# Patient Record
Sex: MALE | Race: WHITE | NOT HISPANIC OR LATINO | Employment: FULL TIME | URBAN - METROPOLITAN AREA
[De-identification: names, ages, dates, MRNs, and addresses within clinical notes are randomized per-mention and may not be internally consistent; named-entity substitution may affect disease eponyms.]

---

## 2017-09-25 ENCOUNTER — OFFICE VISIT (OUTPATIENT)
Dept: MEDICAL GROUP | Facility: PHYSICIAN GROUP | Age: 42
End: 2017-09-25
Payer: COMMERCIAL

## 2017-09-25 VITALS
WEIGHT: 160 LBS | DIASTOLIC BLOOD PRESSURE: 70 MMHG | HEART RATE: 70 BPM | OXYGEN SATURATION: 95 % | SYSTOLIC BLOOD PRESSURE: 120 MMHG | HEIGHT: 68 IN | BODY MASS INDEX: 24.25 KG/M2 | TEMPERATURE: 99.8 F

## 2017-09-25 DIAGNOSIS — R68.82 LOW LIBIDO: ICD-10-CM

## 2017-09-25 DIAGNOSIS — E78.2 MIXED HYPERLIPIDEMIA: ICD-10-CM

## 2017-09-25 DIAGNOSIS — R74.8 ELEVATED LIVER ENZYMES: ICD-10-CM

## 2017-09-25 DIAGNOSIS — F12.90 MARIJUANA USE, CONTINUOUS: ICD-10-CM

## 2017-09-25 DIAGNOSIS — Z00.00 ANNUAL PHYSICAL EXAM: ICD-10-CM

## 2017-09-25 DIAGNOSIS — N52.9 ERECTILE DYSFUNCTION, UNSPECIFIED ERECTILE DYSFUNCTION TYPE: ICD-10-CM

## 2017-09-25 PROCEDURE — 99396 PREV VISIT EST AGE 40-64: CPT | Mod: 25 | Performed by: PHYSICIAN ASSISTANT

## 2017-09-25 PROCEDURE — 99213 OFFICE O/P EST LOW 20 MIN: CPT | Performed by: PHYSICIAN ASSISTANT

## 2017-09-25 RX ORDER — SILDENAFIL 100 MG/1
100 TABLET, FILM COATED ORAL PRN
Qty: 3 TAB | Refills: 0 | Status: SHIPPED | OUTPATIENT
Start: 2017-09-25 | End: 2017-10-17 | Stop reason: SDUPTHER

## 2017-09-25 ASSESSMENT — PATIENT HEALTH QUESTIONNAIRE - PHQ9: CLINICAL INTERPRETATION OF PHQ2 SCORE: 0

## 2017-09-25 NOTE — ASSESSMENT & PLAN NOTE
New problem. Patient states that over the last year, he has noticed decrease in his sex drive, which is very concerning to him. Has had problems with ED for many years, but sex drive was always fine up until a year ago. Also feels very sluggish throughout the day, even when he gets a good nights sleep. Looked his symptoms up online and is worried about low testosterone.

## 2017-09-25 NOTE — ASSESSMENT & PLAN NOTE
Endorses problems with this since his 20s. Does feel that there is a strong mental component, as when he worries that he may not be able to perform, he is unable to maintain an erection. Was started on Viagra by previous PCP, which has usually helped, although not as much recently. Caveats, however, that the pills he's been using are nearly 2 years past the expiration date. Is requesting refill today.

## 2017-09-25 NOTE — PROGRESS NOTES
Chief Complaint: Annual, erectile dysfunction, low sex drive    Barrett Goldstein is a 42 y.o. male who presents for the above complaints. He is a new patient to me and is also establishing care.    Last colonoscopy: N/A  Last Td: 2008, per state registry  Regular exercise: yes. Goes to the gym consistently.    He  has a past medical history of Erectile dysfunction and Hyperlipidemia. He also has no past medical history of Allergy.  He  has a past surgical history that includes vasectomy.  Current Outpatient Prescriptions   Medication Sig Dispense Refill   • sildenafil citrate (VIAGRA) 100 MG tablet Take 1 Tab by mouth as needed for Erectile Dysfunction. 3 Tab 0     No current facility-administered medications for this visit.      Social History   Substance Use Topics   • Smoking status: Never Smoker   • Smokeless tobacco: Never Used   • Alcohol use 7.2 oz/week     12 Cans of beer per week       Current Problems:  Hyperlipidemia  Patient endorses history of this in the past. Has not had labs done since 2013. Patient states he has improved his lifestyle since then. Specifically, states that he is eating better and going to the gym consistently.    Erectile dysfunction  Endorses problems with this since his 20s. Does feel that there is a strong mental component, as when he worries that he may not be able to perform, he is unable to maintain an erection. Was started on Viagra by previous PCP, which has usually helped, although not as much recently. Caveats, however, that the pills he's been using are nearly 2 years past the expiration date. Is requesting refill today.    Low libido  New problem. Patient states that over the last year, he has noticed decrease in his sex drive, which is very concerning to him. Has had problems with ED for many years, but sex drive was always fine up until a year ago. Also feels very sluggish throughout the day, even when he gets a good nights sleep. Looked his symptoms up online and is worried  "about low testosterone.     Elevated liver enzymes  History of this in the past when he was seeing prior PCP. Patient does mention that he used to drink quite heavily and has since significantly cut back.     Marijuana use, continuous  Patient admits to daily marijuana use for many years (at least since his 20s).      Review Of Systems  Eyes: negative for visual blurring, double vision, eye pain  Ears/Nose/Throat: negative for ear pain, hearing loss  Respiratory: negative for dyspnea  Cardiovascular: negative for chest pain or pressure  Gastrointestinal: negative for nausea, abdominal pain, constipation or diarrhea, black stool or bloody stool  Genitourinary: positive for erectile dysfunction, low libido. Negative for testicular atrophy  Psychiatric: positive for sexual difficulties      PHYSICAL EXAMINATION:  Blood pressure 120/70, pulse 70, temperature 37.7 °C (99.8 °F), height 1.727 m (5' 8\"), weight 72.6 kg (160 lb), SpO2 95 %.  Body mass index is 24.33 kg/m².  Wt Readings from Last 4 Encounters:   09/25/17 72.6 kg (160 lb)   01/21/16 74.7 kg (164 lb 9.6 oz)   09/12/14 71.7 kg (158 lb)   08/04/14 72.6 kg (160 lb)       Constitutional: Alert, well-built, no distress.  Skin: Warm, dry, good turgor, multiple tattoos on upper body; no rashes or suspicious lesions in visible areas.  Eye: pupils equal, round and reactive to light, conjunctivae clear, lids normal.  ENMT: Lips without lesions, good dentition, oropharynx clear. Pinnae skin normal with no lesions. TM pearly gray with normal light reflex.   Neck: supple, no masses. No submandibular, anterior cervical adenopathy. No thyromegaly.  Respiratory: Unlabored respiratory effort, lungs clear to auscultation, no wheezes, no ronchi.  Cardiovascular: Normal S1, S2, no murmur, no peripheral edema.  Abdomen: Abdomen Soft, non-tender, no masses, no hepatosplenomegaly. No CVAT.  Psych: Alert and oriented x3, normal affect and mood.  Musc/Skel: Normal motion UE and LE " proximal and distal.        ASSESSMENT/PLAN:    1. Annual physical exam  HCM:  Influenza vaccine offered today, but patient declined. All other preventative screening is UTD.   Labs ordered  Pt counseled about diet and exercise.  - COMP METABOLIC PANEL; Future  - CBC WITH DIFFERENTIAL; Future  - LIPID PROFILE; Future  - TSH WITH REFLEX TO FT4; Future    2. Erectile dysfunction, unspecified erectile dysfunction type  Explained to patient that we can certainly check his testosterone, but suspect his chronic heavy marijuana use is largely contributing to his sexual dysfunction. Patient was advised to stop smoking marijuana. I did review his serum testosterone level from 2013, which was normal.  - sildenafil citrate (VIAGRA) 100 MG tablet; Take 1 Tab by mouth as needed for Erectile Dysfunction.  Dispense: 3 Tab; Refill: 0  - TESTOSTERONE SERUM; Future    3. Low libido  - TESTOSTERONE SERUM; Future    4. Mixed hyperlipidemia  Will re-check lipid panel.  - LIPID PROFILE; Future    5. Elevated liver enzymes  Will re-check CMP.    6. Marijuana use, continuous  Cessation was encouraged, both for overall health and to improve libido and sexual function.    Return if symptoms worsen or fail to improve.    Ching Frey P.A.-C.

## 2017-09-25 NOTE — ASSESSMENT & PLAN NOTE
Patient endorses history of this in the past. Has not had labs done since 2013. Patient states he has improved his lifestyle since then. Specifically, states that he is eating better and going to the gym consistently.

## 2017-09-25 NOTE — ASSESSMENT & PLAN NOTE
History of this in the past when he was seeing prior PCP. Patient does mention that he used to drink quite heavily and has since significantly cut back.

## 2017-09-26 ENCOUNTER — HOSPITAL ENCOUNTER (OUTPATIENT)
Dept: LAB | Facility: MEDICAL CENTER | Age: 42
End: 2017-09-26
Attending: PHYSICIAN ASSISTANT
Payer: COMMERCIAL

## 2017-09-26 DIAGNOSIS — E78.2 MIXED HYPERLIPIDEMIA: ICD-10-CM

## 2017-09-26 DIAGNOSIS — N52.9 ERECTILE DYSFUNCTION, UNSPECIFIED ERECTILE DYSFUNCTION TYPE: ICD-10-CM

## 2017-09-26 DIAGNOSIS — R68.82 LOW LIBIDO: ICD-10-CM

## 2017-09-26 DIAGNOSIS — Z00.00 ANNUAL PHYSICAL EXAM: ICD-10-CM

## 2017-09-26 LAB
ALBUMIN SERPL BCP-MCNC: 4.6 G/DL (ref 3.2–4.9)
ALBUMIN/GLOB SERPL: 1.8 G/DL
ALP SERPL-CCNC: 55 U/L (ref 30–99)
ALT SERPL-CCNC: 15 U/L (ref 2–50)
ANION GAP SERPL CALC-SCNC: 7 MMOL/L (ref 0–11.9)
AST SERPL-CCNC: 23 U/L (ref 12–45)
BASOPHILS # BLD AUTO: 0.5 % (ref 0–1.8)
BASOPHILS # BLD: 0.03 K/UL (ref 0–0.12)
BILIRUB SERPL-MCNC: 0.8 MG/DL (ref 0.1–1.5)
BUN SERPL-MCNC: 20 MG/DL (ref 8–22)
CALCIUM SERPL-MCNC: 9.9 MG/DL (ref 8.5–10.5)
CHLORIDE SERPL-SCNC: 106 MMOL/L (ref 96–112)
CHOLEST SERPL-MCNC: 140 MG/DL (ref 100–199)
CO2 SERPL-SCNC: 27 MMOL/L (ref 20–33)
CREAT SERPL-MCNC: 0.96 MG/DL (ref 0.5–1.4)
EOSINOPHIL # BLD AUTO: 0.15 K/UL (ref 0–0.51)
EOSINOPHIL NFR BLD: 2.4 % (ref 0–6.9)
ERYTHROCYTE [DISTWIDTH] IN BLOOD BY AUTOMATED COUNT: 43.9 FL (ref 35.9–50)
EST. AVERAGE GLUCOSE BLD GHB EST-MCNC: 108 MG/DL
GFR SERPL CREATININE-BSD FRML MDRD: >60 ML/MIN/1.73 M 2
GLOBULIN SER CALC-MCNC: 2.6 G/DL (ref 1.9–3.5)
GLUCOSE SERPL-MCNC: 97 MG/DL (ref 65–99)
HBA1C MFR BLD: 5.4 % (ref 0–5.6)
HCT VFR BLD AUTO: 46.2 % (ref 42–52)
HDLC SERPL-MCNC: 50 MG/DL
HGB BLD-MCNC: 15.7 G/DL (ref 14–18)
IMM GRANULOCYTES # BLD AUTO: 0.03 K/UL (ref 0–0.11)
IMM GRANULOCYTES NFR BLD AUTO: 0.5 % (ref 0–0.9)
LDLC SERPL CALC-MCNC: 62 MG/DL
LYMPHOCYTES # BLD AUTO: 1.94 K/UL (ref 1–4.8)
LYMPHOCYTES NFR BLD: 31.1 % (ref 22–41)
MCH RBC QN AUTO: 32.3 PG (ref 27–33)
MCHC RBC AUTO-ENTMCNC: 34 G/DL (ref 33.7–35.3)
MCV RBC AUTO: 95.1 FL (ref 81.4–97.8)
MONOCYTES # BLD AUTO: 0.68 K/UL (ref 0–0.85)
MONOCYTES NFR BLD AUTO: 10.9 % (ref 0–13.4)
NEUTROPHILS # BLD AUTO: 3.41 K/UL (ref 1.82–7.42)
NEUTROPHILS NFR BLD: 54.6 % (ref 44–72)
NRBC # BLD AUTO: 0 K/UL
NRBC BLD AUTO-RTO: 0 /100 WBC
PLATELET # BLD AUTO: 259 K/UL (ref 164–446)
PMV BLD AUTO: 9 FL (ref 9–12.9)
POTASSIUM SERPL-SCNC: 4.5 MMOL/L (ref 3.6–5.5)
PROT SERPL-MCNC: 7.2 G/DL (ref 6–8.2)
RBC # BLD AUTO: 4.86 M/UL (ref 4.7–6.1)
SODIUM SERPL-SCNC: 140 MMOL/L (ref 135–145)
TESTOST SERPL-MCNC: 548 NG/DL (ref 175–781)
TRIGL SERPL-MCNC: 139 MG/DL (ref 0–149)
TSH SERPL DL<=0.005 MIU/L-ACNC: 2.61 UIU/ML (ref 0.3–3.7)
WBC # BLD AUTO: 6.2 K/UL (ref 4.8–10.8)

## 2017-09-26 PROCEDURE — 83036 HEMOGLOBIN GLYCOSYLATED A1C: CPT

## 2017-09-26 PROCEDURE — 80061 LIPID PANEL: CPT

## 2017-09-26 PROCEDURE — 85025 COMPLETE CBC W/AUTO DIFF WBC: CPT

## 2017-09-26 PROCEDURE — 80053 COMPREHEN METABOLIC PANEL: CPT

## 2017-09-26 PROCEDURE — 84403 ASSAY OF TOTAL TESTOSTERONE: CPT

## 2017-09-26 PROCEDURE — 84443 ASSAY THYROID STIM HORMONE: CPT

## 2017-09-26 PROCEDURE — 36415 COLL VENOUS BLD VENIPUNCTURE: CPT

## 2017-09-27 ENCOUNTER — TELEPHONE (OUTPATIENT)
Dept: MEDICAL GROUP | Facility: PHYSICIAN GROUP | Age: 42
End: 2017-09-27

## 2017-09-27 NOTE — TELEPHONE ENCOUNTER
----- Message from Ching Frey P.A.-C. sent at 9/27/2017  8:14 AM PDT -----  Please inform patient that all of his labs are normal, including his testosterone level, which was well within the normal range at 548.  Ching Frey P.A.-C.

## 2017-11-20 ENCOUNTER — APPOINTMENT (OUTPATIENT)
Dept: RADIOLOGY | Facility: IMAGING CENTER | Age: 42
End: 2017-11-20
Attending: NURSE PRACTITIONER
Payer: COMMERCIAL

## 2017-11-20 ENCOUNTER — OFFICE VISIT (OUTPATIENT)
Dept: URGENT CARE | Facility: PHYSICIAN GROUP | Age: 42
End: 2017-11-20
Payer: COMMERCIAL

## 2017-11-20 VITALS
DIASTOLIC BLOOD PRESSURE: 48 MMHG | BODY MASS INDEX: 24.63 KG/M2 | TEMPERATURE: 98.5 F | OXYGEN SATURATION: 96 % | HEART RATE: 71 BPM | SYSTOLIC BLOOD PRESSURE: 106 MMHG | WEIGHT: 162 LBS

## 2017-11-20 DIAGNOSIS — S50.12XA CONTUSION OF LEFT FOREARM, INITIAL ENCOUNTER: ICD-10-CM

## 2017-11-20 DIAGNOSIS — S59.902A INJURY OF LEFT ELBOW, INITIAL ENCOUNTER: ICD-10-CM

## 2017-11-20 PROCEDURE — 73080 X-RAY EXAM OF ELBOW: CPT | Mod: TC,LT | Performed by: NURSE PRACTITIONER

## 2017-11-20 PROCEDURE — 99213 OFFICE O/P EST LOW 20 MIN: CPT | Performed by: NURSE PRACTITIONER

## 2017-11-20 ASSESSMENT — ENCOUNTER SYMPTOMS
FALLS: 0
WEAKNESS: 0
TINGLING: 0
FEVER: 0
CHILLS: 0
MYALGIAS: 1
SENSORY CHANGE: 0
BRUISES/BLEEDS EASILY: 0

## 2017-11-20 NOTE — PROGRESS NOTES
"Subjective:      Barrett Goldstein is a 42 y.o. male who presents with Arm Injury (Lt arm pain, swelling and bruising x 1 day. Possible torn bicep. Pt was moving a stove when he felt a tear in the elbow area )            HPI  Barrett is a 42 year old male who is here for left arm injury last night. Moving stove using a skateboard\". Felt pain in left elbow region with \"pop\" in elbow region. States his bicep is \"bigger\". Ice and Ibuprofen.    PMH:  has a past medical history of Erectile dysfunction and Hyperlipidemia. He also has no past medical history of Allergy.  MEDS:   Current Outpatient Prescriptions:   •  sildenafil citrate (VIAGRA) 100 MG tablet, Take 1 Tab by mouth as needed for Erectile Dysfunction., Disp: 3 Tab, Rfl: 5  ALLERGIES:   Allergies   Allergen Reactions   • Nkda [No Known Drug Allergy]      SURGHX:   Past Surgical History:   Procedure Laterality Date   • VASECTOMY       SOCHX:  reports that he has never smoked. He has never used smokeless tobacco. He reports that he drinks about 7.2 oz of alcohol per week . He reports that he uses drugs, including Marijuana.  FH: Family history was reviewed, no pertinent findings to report    Review of Systems   Constitutional: Negative for chills, fever and malaise/fatigue.   Musculoskeletal: Positive for myalgias. Negative for falls and joint pain.   Neurological: Negative for tingling, sensory change and weakness.   Endo/Heme/Allergies: Does not bruise/bleed easily.   All other systems reviewed and are negative.         Objective:     /48   Pulse 71   Temp 36.9 °C (98.5 °F)   Wt 73.5 kg (162 lb)   SpO2 96%   BMI 24.63 kg/m²      Physical Exam   Constitutional: He is oriented to person, place, and time. Vital signs are normal. He appears well-developed and well-nourished. He is active and cooperative.  Non-toxic appearance. He does not appear ill. No distress.   HENT:   Head: Normocephalic.   Eyes: Conjunctivae and EOM are normal. Pupils are equal, round, and " "reactive to light.   Neck: Normal range of motion. Neck supple.   Cardiovascular: Normal rate.    Pulmonary/Chest: Effort normal.   Musculoskeletal:        Left elbow: He exhibits normal range of motion, no swelling, no effusion, no deformity and no laceration. No tenderness found.        Left forearm: He exhibits swelling. He exhibits no tenderness, no bony tenderness, no edema and no deformity.        Arms:  No bicep bulging, able to flex.extend left elbow without difficulty or pain.    Neurological: He is alert and oriented to person, place, and time.   Skin: Skin is warm and dry. He is not diaphoretic.   Vitals reviewed.    Elbow xray:    No acute fracture identified. If the patient's symptoms persist, follow-up imaging would be recommended.     MA applied ace wrap to left elbow  Assessment/Plan:     1. Injury of left elbow, initial encounter    - DX-ELBOW-COMPLETE 3+ LEFT; Future    2. Contusion of left forearm, initial encounter    Possible tendon tear of left bicep or muscle tear- refer to orthopedics as patient is worried about this  May use Ibuprofen prn for pain/swelling  May use cool compresses for swelling prn  May utilize RICE method prn  Avoid excessive weight lifting until muscle soreness in left elbow region decreases  May apply topical analgesics prn  Perform proper body mechanics with lifting, twisting, bending and reaching. Ask for assistance with heavy objects  Monitor for deformity, numbness/tingling in fingers, decreased ROM- need re-evaluation  Follow up with ortho (gave YAMIL info to patient, worried about \"tear in my bicep\")        "

## 2017-12-04 ENCOUNTER — HOSPITAL ENCOUNTER (OUTPATIENT)
Facility: MEDICAL CENTER | Age: 42
End: 2017-12-04
Attending: ORTHOPAEDIC SURGERY | Admitting: ORTHOPAEDIC SURGERY
Payer: COMMERCIAL

## 2017-12-04 ENCOUNTER — APPOINTMENT (OUTPATIENT)
Dept: ADMISSIONS | Facility: MEDICAL CENTER | Age: 42
End: 2017-12-04
Attending: ORTHOPAEDIC SURGERY
Payer: COMMERCIAL

## 2017-12-04 VITALS
WEIGHT: 158.51 LBS | BODY MASS INDEX: 24.02 KG/M2 | TEMPERATURE: 98.4 F | HEART RATE: 50 BPM | RESPIRATION RATE: 16 BRPM | HEIGHT: 68 IN | OXYGEN SATURATION: 98 %

## 2017-12-04 PROBLEM — S46.212A BICEPS TENDON RUPTURE, LEFT, INITIAL ENCOUNTER: Status: ACTIVE | Noted: 2017-12-04

## 2017-12-04 PROCEDURE — 160035 HCHG PACU - 1ST 60 MINS PHASE I: Performed by: ORTHOPAEDIC SURGERY

## 2017-12-04 PROCEDURE — A6222 GAUZE <=16 IN NO W/SAL W/O B: HCPCS | Performed by: ORTHOPAEDIC SURGERY

## 2017-12-04 PROCEDURE — A9270 NON-COVERED ITEM OR SERVICE: HCPCS

## 2017-12-04 PROCEDURE — 160025 RECOVERY II MINUTES (STATS): Performed by: ORTHOPAEDIC SURGERY

## 2017-12-04 PROCEDURE — 160039 HCHG SURGERY MINUTES - EA ADDL 1 MIN LEVEL 3: Performed by: ORTHOPAEDIC SURGERY

## 2017-12-04 PROCEDURE — 700111 HCHG RX REV CODE 636 W/ 250 OVERRIDE (IP)

## 2017-12-04 PROCEDURE — 502576 HCHG PACK, HAND: Performed by: ORTHOPAEDIC SURGERY

## 2017-12-04 PROCEDURE — 160028 HCHG SURGERY MINUTES - 1ST 30 MINS LEVEL 3: Performed by: ORTHOPAEDIC SURGERY

## 2017-12-04 PROCEDURE — 501838 HCHG SUTURE GENERAL: Performed by: ORTHOPAEDIC SURGERY

## 2017-12-04 PROCEDURE — 700101 HCHG RX REV CODE 250

## 2017-12-04 PROCEDURE — 500562 HCHG FIBERWIRE: Performed by: ORTHOPAEDIC SURGERY

## 2017-12-04 PROCEDURE — 700102 HCHG RX REV CODE 250 W/ 637 OVERRIDE(OP)

## 2017-12-04 PROCEDURE — 160046 HCHG PACU - 1ST 60 MINS PHASE II: Performed by: ORTHOPAEDIC SURGERY

## 2017-12-04 PROCEDURE — A6402 STERILE GAUZE <= 16 SQ IN: HCPCS | Performed by: ORTHOPAEDIC SURGERY

## 2017-12-04 PROCEDURE — 160002 HCHG RECOVERY MINUTES (STAT): Performed by: ORTHOPAEDIC SURGERY

## 2017-12-04 PROCEDURE — 160048 HCHG OR STATISTICAL LEVEL 1-5: Performed by: ORTHOPAEDIC SURGERY

## 2017-12-04 PROCEDURE — 160009 HCHG ANES TIME/MIN: Performed by: ORTHOPAEDIC SURGERY

## 2017-12-04 PROCEDURE — 302135 SEQUENTIAL COMPRESSION MACHINE: Performed by: ORTHOPAEDIC SURGERY

## 2017-12-04 PROCEDURE — 700105 HCHG RX REV CODE 258: Performed by: ORTHOPAEDIC SURGERY

## 2017-12-04 RX ORDER — DIPHENHYDRAMINE HYDROCHLORIDE 50 MG/ML
25 INJECTION INTRAMUSCULAR; INTRAVENOUS EVERY 6 HOURS PRN
Status: DISCONTINUED | OUTPATIENT
Start: 2017-12-04 | End: 2017-12-04 | Stop reason: HOSPADM

## 2017-12-04 RX ORDER — HALOPERIDOL 5 MG/ML
1 INJECTION INTRAMUSCULAR EVERY 6 HOURS PRN
Status: DISCONTINUED | OUTPATIENT
Start: 2017-12-04 | End: 2017-12-04 | Stop reason: HOSPADM

## 2017-12-04 RX ORDER — ONDANSETRON 2 MG/ML
4 INJECTION INTRAMUSCULAR; INTRAVENOUS EVERY 4 HOURS PRN
Status: DISCONTINUED | OUTPATIENT
Start: 2017-12-04 | End: 2017-12-04 | Stop reason: HOSPADM

## 2017-12-04 RX ORDER — BUPIVACAINE HYDROCHLORIDE 5 MG/ML
INJECTION, SOLUTION EPIDURAL; INTRACAUDAL
Status: DISCONTINUED | OUTPATIENT
Start: 2017-12-04 | End: 2017-12-04 | Stop reason: HOSPADM

## 2017-12-04 RX ORDER — ONDANSETRON 2 MG/ML
INJECTION INTRAMUSCULAR; INTRAVENOUS
Status: COMPLETED
Start: 2017-12-04 | End: 2017-12-04

## 2017-12-04 RX ORDER — DEXAMETHASONE SODIUM PHOSPHATE 4 MG/ML
4 INJECTION, SOLUTION INTRA-ARTICULAR; INTRALESIONAL; INTRAMUSCULAR; INTRAVENOUS; SOFT TISSUE
Status: DISCONTINUED | OUTPATIENT
Start: 2017-12-04 | End: 2017-12-04 | Stop reason: HOSPADM

## 2017-12-04 RX ORDER — OXYCODONE HCL 5 MG/5 ML
SOLUTION, ORAL ORAL
Status: COMPLETED
Start: 2017-12-04 | End: 2017-12-04

## 2017-12-04 RX ORDER — SODIUM CHLORIDE, SODIUM LACTATE, POTASSIUM CHLORIDE, CALCIUM CHLORIDE 600; 310; 30; 20 MG/100ML; MG/100ML; MG/100ML; MG/100ML
1000 INJECTION, SOLUTION INTRAVENOUS
Status: DISCONTINUED | OUTPATIENT
Start: 2017-12-04 | End: 2017-12-04 | Stop reason: HOSPADM

## 2017-12-04 RX ORDER — OXYCODONE HYDROCHLORIDE 10 MG/1
10 TABLET ORAL
Status: DISCONTINUED | OUTPATIENT
Start: 2017-12-04 | End: 2017-12-04 | Stop reason: HOSPADM

## 2017-12-04 RX ORDER — SCOLOPAMINE TRANSDERMAL SYSTEM 1 MG/1
1 PATCH, EXTENDED RELEASE TRANSDERMAL
Status: DISCONTINUED | OUTPATIENT
Start: 2017-12-04 | End: 2017-12-04 | Stop reason: HOSPADM

## 2017-12-04 RX ADMIN — OXYCODONE HYDROCHLORIDE 10 MG: 5 SOLUTION ORAL at 18:45

## 2017-12-04 RX ADMIN — ONDANSETRON 4 MG: 2 INJECTION INTRAMUSCULAR; INTRAVENOUS at 19:35

## 2017-12-04 RX ADMIN — SODIUM CHLORIDE, POTASSIUM CHLORIDE, SODIUM LACTATE AND CALCIUM CHLORIDE 1000 ML: 600; 310; 30; 20 INJECTION, SOLUTION INTRAVENOUS at 16:15

## 2017-12-04 ASSESSMENT — PAIN SCALES - GENERAL
PAINLEVEL_OUTOF10: 1
PAINLEVEL_OUTOF10: 0
PAINLEVEL_OUTOF10: 1
PAINLEVEL_OUTOF10: 1

## 2017-12-05 NOTE — OR SURGEON
Immediate Post OP Note    PreOp Diagnosis: L biceps tendon rupture    PostOp Diagnosis: same    Procedure(s):  BICEPS TENDON REPAIR - Wound Class: Clean    Surgeon(s):  Erich Julio M.D.    Anesthesiologist/Type of Anesthesia:  Anesthesiologist: Holland Ledesma III, M.D./General    Surgical Staff:  Circulator: Isabell Rodriguez R.N.  Scrub Person: Johnny Mata    Specimens:  * No specimens in log *    Estimated Blood Loss: min    Findings: complete rupture    Complications: none        12/4/2017 6:34 PM Erich Julio

## 2017-12-05 NOTE — OP REPORT
DATE OF SERVICE:  12/04/2017    DATE OF OPERATION:  12/04/2017.    PREOPERATIVE DIAGNOSIS:  Left biceps tendon rupture.    POSTOPERATIVE DIAGNOSIS:  Left biceps tendon rupture.    PROCEDURE:  Left biceps tendon repair.    SURGEON:  Erich Julio MD    ANESTHESIA:  General.    ESTIMATED BLOOD LOSS:  Minimal.    DRAINS:  None.    COMPLICATIONS:  None.    INDICATIONS:  Patient is a gentleman who had a trauma, felt a rupture of his   proximal biceps.  He was subsequently MRI diagnosed.  He was explained the   risks, benefits, complications, and alternatives of surgery.  All questions   were answered.  No guarantees were given.  Signed consent was obtained.    DESCRIPTION OF PROCEDURE:  Patient was taken to operating room and laid supine   on the table.  All bony prominences well padded.  Adequate general was   obtained without difficulty.  Left upper extremity was prepped and draped in   the usual sterile fashion using a ChloraPrep.  Limb was exsanguinated with   elevation.  Tourniquet was raised.  Total tourniquet time was under an hour.    I made a small anterior incision in the antecubital fossa, subcutaneous   tissues bluntly dissected.  The flexor tendon was identified, freed it from   surrounding attachments.  It was trimmed.  I placed #2 FiberWires in a running   baseball stitch proximally and distally.  This was then pulled traction   distally.  The wounds were irrigated.  I took a hemostat, found the tract,   passed it to the posterior aspect of the radius in the forearm.  I made a   secondary incision posteriorly.  Skin and subcutaneous tissues down to the   fascia, was incised in line and the muscle was split down, the bone was   elevated in a subperiosteal fashion.  I then prepared a trough to pull the   tendon back into using tenodesis reamers.  Subsequently once this was   repaired, all the edges were rongeured; any sharp edges.  I placed 2 drill   holes entering into the trough.  I then passed the  tendon sutures to the   posterior wound.  They were then passed into the trough and out the drill   holes.  The tendon was then guided into the prepared trough at the radial   tuberosity with the wrist in some supination.  Traction was placed on one of   the sutures and the other one was tied.  The construct was visualized and fell   both in the anterior and posterior wounds.  There was nice tension on the   biceps tendon; it seemed to move nicely symmetrically with pronation and   supination.  The wounds were all copiously irrigated of any bone debris or   soft tissues.  Tourniquet was let down.  Hemostasis obtained with   electrocautery.  The anterior wound was closed with Vicryl and nylon to the   posterior wound.  The fascia was closed with Vicryl, subcutaneous with Vicryl,   skin with staples.  Local without epinephrine was injected.  Sterile dressing   of Xeroform, 4x4, Sof-Rol, well-padded, posterior splint, a frame and some   supination was applied.  All needle and sponge counts were correct.  Patient   tolerated the procedure well and was transferred to recovery in stable   condition.       ____________________________________     MD YOANNA MIJARES / CRISTELA    DD:  12/04/2017 18:48:58  DT:  12/04/2017 22:12:24    D#:  [id number]  Job#:  [id number]

## 2017-12-05 NOTE — OR NURSING
1836 - Patient admitted from OR to PACU - drowsy with spontaneous respirations and clear breath sounds bilaterally. Left arm splint and ace wrap dressing clean, dry, and intact. Positive CMS all left fingers. Sling in place. Pain 1/10 and very tolerable. Denies nausea. Report from PETER Whyte and Dr. Ledesma. VS as noted.     1850 - less drowsy. Pain 1/ 10 . Denies nausea - tolerating sips of water. VS as noted.     1905 - Awake and cooperative. Pain 1/10. Denies nausea. VS as noted.     1920 - Awake and cooperative. Pain 1/10 and very tolerable per patient. Denies nausea. Splint and dressing remain clean, dry, and intact. Positive CMS all left fingers. Sling in place. VS as noted. Meets criteria and transferred to Stage 2 status in PACU with same rn. Assisted patient with dressing and [facility].    1930 -  Spouse to chairside. Discharge instructions to patient and spouse - state understanding.     1935 - Pt C/O nausea - medicated with Zofran as noted on MAR.     1950 - Patient assisted to bathroom - voided without difficulty.     2000 - Nausea resolved. Remains as noted above.Meets criteria and discharged via wheelchair to spouse to private vehicle. Upon getting into car patient had 200 clear emesis - no nausea after emesis.

## 2017-12-05 NOTE — DISCHARGE INSTRUCTIONS
ACTIVITY: Rest and take it easy for the first 24 hours.  A responsible adult is recommended to remain with you during that time.  It is normal to feel sleepy.  We encourage you to not do anything that requires balance, judgment or coordination.    MILD FLU-LIKE SYMPTOMS ARE NORMAL. YOU MAY EXPERIENCE GENERALIZED MUSCLE ACHES, THROAT IRRITATION, HEADACHE AND/OR SOME NAUSEA.    FOR 24 HOURS DO NOT:  Drive, operate machinery or run household appliances.  Drink beer or alcoholic beverages.   Make important decisions or sign legal documents.    SPECIAL INSTRUCTIONS:     KEEP RIGHT ARM IN SLING - KEEP IT ELEVATED.     MOVE ALL YOUR LEFT FINGERS FREQUENTLY SO THEY DO NOT BECOME STIFF    DIET: To avoid nausea, slowly advance diet as tolerated, avoiding spicy or greasy foods for the first day.  Add more substantial food to your diet according to your physician's instructions.  Babies can be fed formula or breast milk as soon as they are hungry.  INCREASE FLUIDS AND FIBER TO AVOID CONSTIPATION.    SURGICAL DRESSING/BATHING:     KEEP THE SPLINT AND DRESSING IN PLACE  - DO NOT REMOVE - IT WILL BE REMOVED AT YOUR FOLLOW UP APPOINTMENT    KEEP SPLINT AND DRESSING CLEAN, DRY, AND INTACT. DO NOT GET IT WET.     FOLLOW-UP APPOINTMENT:  A follow-up appointment should be arranged with your doctor in : CALL OFFICE TOMORROW TO SCHEDULE A FOLLOW UP APPOINTMENT # [phone]    You should CALL YOUR PHYSICIAN if you develop:  Fever greater than 101 degrees F.  Pain not relieved by medication, or persistent nausea or vomiting.  Excessive bleeding (blood soaking through dressing) or unexpected drainage from the wound.  Extreme redness or swelling around the incision site, drainage of pus or foul smelling drainage.  Inability to urinate or empty your bladder within 8 hours.  Problems with breathing or chest pain.    You should call 911 if you develop problems with breathing or chest pain.  If you are unable to contact your doctor or surgical  center, you should go to the nearest emergency room or urgent care center.  Physician's telephone #: [phone]    If any questions arise, call your doctor.  If your doctor is not available, please feel free to call the Surgical Center at {Surgical Dept Numbers:[id number]}.  The Center is open Monday through Friday from 7AM to 7PM.  You can also call the HEALTH HOTLINE open 24 hours/day, 7 days/week and speak to a nurse at [phone], or toll free at [phone].    A registered nurse may call you a few days after your surgery to see how you are doing after your procedure.    MEDICATIONS: Resume taking daily medication.  Take prescribed pain medication with food.  If no medication is prescribed, you may take non-aspirin pain medication if needed.  PAIN MEDICATION CAN BE VERY CONSTIPATING.  Take a stool softener or laxative such as senokot, pericolace, or milk of magnesia if needed.    Prescription given for OXYCODONE AND CELEBREX  Last pain medication given at 6:45 PM - NEXT DUE AT 10:45 PM IF NEEDED. .    If your physician has prescribed pain medication that includes Acetaminophen (Tylenol), do not take additional Acetaminophen (Tylenol) while taking the prescribed medication.    Depression / Suicide Risk    As you are discharged from this [facility] facility, it is important to learn how to keep safe from harming yourself.    Recognize the warning signs:  · Abrupt changes in personality, positive or negative- including increase in energy   · Giving away possessions  · Change in eating patterns- significant weight changes-  positive or negative  · Change in sleeping patterns- unable to sleep or sleeping all the time   · Unwillingness or inability to communicate  · Depression  · Unusual sadness, discouragement and loneliness  · Talk of wanting to die  · Neglect of personal appearance   · Rebelliousness- reckless behavior  · Withdrawal from people/activities they love  · Confusion- inability to concentrate     If  you or a loved one observes any of these behaviors or has concerns about self-harm, here's what you can do:  · Talk about it- your feelings and reasons for harming yourself  · Remove any means that you might use to hurt yourself (examples: pills, rope, extension cords, firearm)  · Get professional help from the community (Mental Health, Substance Abuse, psychological counseling)  · Do not be alone:Call your Safe Contact- someone whom you trust who will be there for you.  · Call your local CRISIS HOTLINE 323-2857 or 417-773-2774  · Call your local Children's Mobile Crisis Response Team [place] (976) 292-7997 or www.[employer]  · Call the toll free National Suicide Prevention Hotlines   · National Suicide Prevention Lifeline 262-563-TQLD (0705)  · National Hope Line Network 800-SUICIDE (681-8905)

## 2019-03-08 DIAGNOSIS — N52.9 ERECTILE DYSFUNCTION, UNSPECIFIED ERECTILE DYSFUNCTION TYPE: ICD-10-CM

## 2019-03-11 RX ORDER — SILDENAFIL 100 MG/1
TABLET, FILM COATED ORAL
Qty: 3 TAB | Refills: 2 | Status: SHIPPED | OUTPATIENT
Start: 2019-03-11 | End: 2019-07-25 | Stop reason: SDUPTHER

## 2019-07-25 DIAGNOSIS — N52.9 ERECTILE DYSFUNCTION, UNSPECIFIED ERECTILE DYSFUNCTION TYPE: ICD-10-CM

## 2019-07-25 RX ORDER — SILDENAFIL 100 MG/1
TABLET, FILM COATED ORAL
Qty: 5 TAB | Refills: 0 | Status: SHIPPED | OUTPATIENT
Start: 2019-07-25 | End: 2019-08-13 | Stop reason: SDUPTHER

## 2019-07-26 NOTE — TELEPHONE ENCOUNTER
Please inform patient that I have given him small supply of Viagra. He has not been seen since September 2017 so needs appointment prior to any further refills.

## 2019-08-01 ENCOUNTER — TELEPHONE (OUTPATIENT)
Dept: MEDICAL GROUP | Facility: PHYSICIAN GROUP | Age: 44
End: 2019-08-01

## 2019-08-01 NOTE — TELEPHONE ENCOUNTER
1. Caller Name: Jason                                         Call Back Number: [phone] (home)       Patient approves a detailed voicemail message: N\A    Pt called and stated script was sent to the incorrect pharmacy. Updated pharmacy to [facility] on [address] in [place] and called in RX.

## 2019-08-13 ENCOUNTER — OFFICE VISIT (OUTPATIENT)
Dept: MEDICAL GROUP | Facility: PHYSICIAN GROUP | Age: 44
End: 2019-08-13
Payer: COMMERCIAL

## 2019-08-13 VITALS
BODY MASS INDEX: 24.71 KG/M2 | WEIGHT: 163 LBS | TEMPERATURE: 98 F | HEIGHT: 68 IN | HEART RATE: 78 BPM | OXYGEN SATURATION: 95 % | DIASTOLIC BLOOD PRESSURE: 70 MMHG | SYSTOLIC BLOOD PRESSURE: 134 MMHG

## 2019-08-13 DIAGNOSIS — F95.1 CHRONIC MOTOR TIC: ICD-10-CM

## 2019-08-13 DIAGNOSIS — N52.9 ERECTILE DYSFUNCTION, UNSPECIFIED ERECTILE DYSFUNCTION TYPE: ICD-10-CM

## 2019-08-13 DIAGNOSIS — Z00.00 BLOOD TESTS FOR ROUTINE GENERAL PHYSICAL EXAMINATION: ICD-10-CM

## 2019-08-13 PROCEDURE — 99214 OFFICE O/P EST MOD 30 MIN: CPT | Performed by: PHYSICIAN ASSISTANT

## 2019-08-13 RX ORDER — SILDENAFIL 100 MG/1
TABLET, FILM COATED ORAL
Qty: 5 TAB | Refills: 11 | Status: SHIPPED | OUTPATIENT
Start: 2019-08-13 | End: 2019-10-18 | Stop reason: SDUPTHER

## 2019-08-13 ASSESSMENT — PATIENT HEALTH QUESTIONNAIRE - PHQ9: CLINICAL INTERPRETATION OF PHQ2 SCORE: 0
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
## 2019-10-16 DIAGNOSIS — N52.9 ERECTILE DYSFUNCTION, UNSPECIFIED ERECTILE DYSFUNCTION TYPE: ICD-10-CM

## 2019-10-17 RX ORDER — SILDENAFIL 100 MG/1
TABLET, FILM COATED ORAL
Refills: 0 | OUTPATIENT
Start: 2019-10-17

## 2020-05-20 ENCOUNTER — HOSPITAL ENCOUNTER (OUTPATIENT)
Facility: MEDICAL CENTER | Age: 45
End: 2020-05-20
Payer: COMMERCIAL

## 2020-05-23 LAB
SARS-COV-2 RNA SPEC QL NAA+PROBE: NOT DETECTED
SPECIMEN SOURCE: NORMAL

## 2021-04-27 ENCOUNTER — IMMUNIZATION (OUTPATIENT)
Dept: FAMILY PLANNING/WOMEN'S HEALTH CLINIC | Facility: IMMUNIZATION CENTER | Age: 46
End: 2021-04-27
Payer: COMMERCIAL

## 2021-04-27 DIAGNOSIS — Z23 ENCOUNTER FOR VACCINATION: Primary | ICD-10-CM

## 2021-04-27 PROCEDURE — 91300 PFIZER SARS-COV-2 VACCINE: CPT

## 2021-04-27 PROCEDURE — 0001A PFIZER SARS-COV-2 VACCINE: CPT

## 2021-05-20 ENCOUNTER — IMMUNIZATION (OUTPATIENT)
Dept: FAMILY PLANNING/WOMEN'S HEALTH CLINIC | Facility: IMMUNIZATION CENTER | Age: 46
End: 2021-05-20
Payer: COMMERCIAL

## 2021-05-20 DIAGNOSIS — Z23 ENCOUNTER FOR VACCINATION: Primary | ICD-10-CM

## 2021-05-20 PROCEDURE — 0002A PFIZER SARS-COV-2 VACCINE: CPT | Performed by: INTERNAL MEDICINE

## 2021-05-20 PROCEDURE — 91300 PFIZER SARS-COV-2 VACCINE: CPT | Performed by: INTERNAL MEDICINE

## 2021-06-24 ENCOUNTER — OFFICE VISIT (OUTPATIENT)
Dept: MEDICAL GROUP | Facility: PHYSICIAN GROUP | Age: 46
End: 2021-06-24
Payer: COMMERCIAL

## 2021-06-24 VITALS
BODY MASS INDEX: 24.69 KG/M2 | HEART RATE: 63 BPM | DIASTOLIC BLOOD PRESSURE: 52 MMHG | SYSTOLIC BLOOD PRESSURE: 124 MMHG | TEMPERATURE: 98.5 F | HEIGHT: 68 IN | WEIGHT: 162.9 LBS | OXYGEN SATURATION: 94 %

## 2021-06-24 DIAGNOSIS — N52.9 ERECTILE DYSFUNCTION, UNSPECIFIED ERECTILE DYSFUNCTION TYPE: ICD-10-CM

## 2021-06-24 DIAGNOSIS — R68.82 LOW LIBIDO: ICD-10-CM

## 2021-06-24 DIAGNOSIS — R73.01 IMPAIRED FASTING GLUCOSE: ICD-10-CM

## 2021-06-24 DIAGNOSIS — Z78.9 ALCOHOL USE: ICD-10-CM

## 2021-06-24 DIAGNOSIS — E78.2 MIXED HYPERLIPIDEMIA: ICD-10-CM

## 2021-06-24 DIAGNOSIS — Z00.00 HEALTH CARE MAINTENANCE: ICD-10-CM

## 2021-06-24 PROCEDURE — 99204 OFFICE O/P NEW MOD 45 MIN: CPT | Performed by: INTERNAL MEDICINE

## 2021-06-24 RX ORDER — SILDENAFIL 100 MG/1
TABLET, FILM COATED ORAL
Qty: 10 TABLET | Refills: 5 | Status: SHIPPED | OUTPATIENT
Start: 2021-06-24 | End: 2022-08-11

## 2021-06-25 NOTE — PROGRESS NOTES
New Patient to establish    Chief Complaint   Patient presents with   • Establish Care   • Medication Refill       Subjective:     History of Present Illness: Patient is a 45 y.o. male who is here today to establish primary care    1. Erectile dysfunction, unspecified erectile dysfunction type  2. Mixed hyperlipidemia  3. Low libido  Alcohol use  >> Patient is new to me, he is here for labs and medication refill including Viagra, also reported he is drinking like 8-10 beers a week, especially on weekend, otherwise he is physically active, denies any other related symptoms, denies smoking        Current Outpatient Medications on File Prior to Visit   Medication Sig Dispense Refill   • Multiple Vitamins-Minerals (MULTIVITAMIN ADULT PO) Take  by mouth every day.       No current facility-administered medications on file prior to visit.     Allergies   Allergen Reactions   • Nkda [No Known Drug Allergy]      Patient Active Problem List    Diagnosis Date Noted   • Chronic motor tic 08/13/2019   • Biceps tendon rupture, left, initial encounter 12/04/2017   • Low libido 09/25/2017   • Marijuana use, continuous 09/25/2017   • Erectile dysfunction 07/31/2013   • Hyperlipidemia 07/31/2013     Past Medical History:   Diagnosis Date   • Cancer (HCC) 1974    Tumor excised from axillae and then radiation tx.   • Elevated liver enzymes 7/31/2013   • Erectile dysfunction    • Heart murmur age 15    No treatment.   • High cholesterol 12/04/2017     No meds.   • Hyperlipidemia    • Marijuana use     daily   • Pain 12/04/2017    Positional pain.     Past Surgical History:   Procedure Laterality Date   • BICEPS TENDON REPAIR Left 12/4/2017    Procedure: BICEPS TENDON REPAIR;  Surgeon: Erich Julio M.D.;  Location: SURGERY [facility];  Service: Orthopedics   • VASECTOMY Bilateral 2008    NO sedation   • OTHER SURGICAL PROCEDURE Right 1974    CA tumor excised from axillae     Family History   Problem Relation Age of Onset   •  "Alcohol/Drug Father    • Heart Disease Father    • Diabetes Neg Hx    • Hyperlipidemia Neg Hx    • Cancer Neg Hx      Social History     Tobacco Use   • Smoking status: Never Smoker   • Smokeless tobacco: Never Used   Substance Use Topics   • Alcohol use: Yes     Alcohol/week: 7.2 oz     Types: 12 Cans of beer per week     Comment: 8-10 drinks on weekend.   • Drug use: Yes     Types: Marijuana, Inhaled     Comment: daily       ROS:     - Constitutional: Negative for fever, chills,    - Eye: Negative for blurry vision    -ENT: Negative for ear pain    - Respiratory: Negative for cough, hemoptysis    - Cardiovascular: Negative for chest pain     - Gastrointestinal: Negative for abdominal pain    - Genitourinary: Negative for dysuria    - Musculoskeletal: Negative for joint swelling    - Skin: Negative for itching    - Neurological: Negative for focal weakness     - Psychiatric/Behavioral: Negative for depression        Physical Exam:     /52 (BP Location: Right arm, Patient Position: Sitting, BP Cuff Size: Large adult)   Pulse 63   Temp 36.9 °C (98.5 °F) (Temporal)   Ht 1.727 m (5' 8\")   Wt 73.9 kg (162 lb 14.4 oz)   SpO2 94%   BMI 24.77 kg/m²   General: Normal appearing. No distress.  ENT: oropharynx without exudates.    Eyes: conjunctiva clear lids without ptosis  Pulmonary: Clear to ausculation.  Normal effort.   Cardiovascular: Regular rate and rhythm  Abdomen: Soft, nontender,  Lymph: No cervical or supraclavicular palpable lymph nodes  Psych: Normal mood and affect.     @[facility]@      Assessment and Plan:       1. Erectile dysfunction, unspecified erectile dysfunction type  2. Mixed hyperlipidemia  3. Low libido  Alcohol use  - sildenafil citrate (VIAGRA) 100 MG tablet; TAKE 1 TABLET BY MOUTH AS NEEDED FOR  ERECTILE  DYSFUNCTION  *NEED  TO  MAKE  APPT  PRIOR  TO  MORE  REFILLS*  Dispense: 10 tablet; Refill: 5  - Comp Metabolic Panel; Future  - CRP HIGH SENSITIVE (CARDIAC); Future  - VITAMIN D,25 " HYDROXY; Future  - VITAMIN B12; Future  - TSH WITH REFLEX TO FT4; Future  - Lipid Profile; Future      4. Impaired fasting glucose  - HEMOGLOBIN A1C; Future  - INSULIN FASTING; Future    5. Health care maintenance  - CBC WITH DIFFERENTIAL; Future  - Comp Metabolic Panel; Future  - CRP HIGH SENSITIVE (CARDIAC); Future  - MAGNESIUM; Future  - VITAMIN D,25 HYDROXY; Future  - VITAMIN B12; Future  - TSH WITH REFLEX TO FT4; Future    Follow Up:      Return in about 6 weeks (around 8/5/2021) for follow up.  Labs, lifestyle*  Please note that this dictation was created using voice recognition software. I have made every reasonable attempt to correct obvious errors, but I expect that there are errors of grammar and possibly content that I did not discover before finalizing the note.    Signed by: Nanci Christine M.D.

## 2021-07-27 ENCOUNTER — HOSPITAL ENCOUNTER (OUTPATIENT)
Dept: LAB | Facility: MEDICAL CENTER | Age: 46
End: 2021-07-27
Attending: INTERNAL MEDICINE
Payer: COMMERCIAL

## 2021-07-27 DIAGNOSIS — E78.2 MIXED HYPERLIPIDEMIA: ICD-10-CM

## 2021-07-27 DIAGNOSIS — Z00.00 HEALTH CARE MAINTENANCE: ICD-10-CM

## 2021-07-27 DIAGNOSIS — R73.01 IMPAIRED FASTING GLUCOSE: ICD-10-CM

## 2021-07-27 DIAGNOSIS — N52.9 ERECTILE DYSFUNCTION, UNSPECIFIED ERECTILE DYSFUNCTION TYPE: ICD-10-CM

## 2021-07-27 LAB
25(OH)D3 SERPL-MCNC: 40 NG/ML (ref 30–100)
ALBUMIN SERPL BCP-MCNC: 4.8 G/DL (ref 3.2–4.9)
ALBUMIN/GLOB SERPL: 2.2 G/DL
ALP SERPL-CCNC: 60 U/L (ref 30–99)
ALT SERPL-CCNC: 13 U/L (ref 2–50)
ANION GAP SERPL CALC-SCNC: 14 MMOL/L (ref 7–16)
AST SERPL-CCNC: 29 U/L (ref 12–45)
BASOPHILS # BLD AUTO: 1 % (ref 0–1.8)
BASOPHILS # BLD: 0.06 K/UL (ref 0–0.12)
BILIRUB SERPL-MCNC: 0.5 MG/DL (ref 0.1–1.5)
BUN SERPL-MCNC: 23 MG/DL (ref 8–22)
CALCIUM SERPL-MCNC: 9.2 MG/DL (ref 8.5–10.5)
CHLORIDE SERPL-SCNC: 103 MMOL/L (ref 96–112)
CHOLEST SERPL-MCNC: 178 MG/DL (ref 100–199)
CO2 SERPL-SCNC: 22 MMOL/L (ref 20–33)
CREAT SERPL-MCNC: 0.92 MG/DL (ref 0.5–1.4)
CRP SERPL HS-MCNC: 0.6 MG/L (ref 0–7.5)
EOSINOPHIL # BLD AUTO: 0.19 K/UL (ref 0–0.51)
EOSINOPHIL NFR BLD: 3.3 % (ref 0–6.9)
ERYTHROCYTE [DISTWIDTH] IN BLOOD BY AUTOMATED COUNT: 46.4 FL (ref 35.9–50)
GLOBULIN SER CALC-MCNC: 2.2 G/DL (ref 1.9–3.5)
GLUCOSE SERPL-MCNC: 97 MG/DL (ref 65–99)
HCT VFR BLD AUTO: 47.7 % (ref 42–52)
HDLC SERPL-MCNC: 54 MG/DL
HGB BLD-MCNC: 15.7 G/DL (ref 14–18)
IMM GRANULOCYTES # BLD AUTO: 0.02 K/UL (ref 0–0.11)
IMM GRANULOCYTES NFR BLD AUTO: 0.3 % (ref 0–0.9)
LDLC SERPL CALC-MCNC: 105 MG/DL
LYMPHOCYTES # BLD AUTO: 1.97 K/UL (ref 1–4.8)
LYMPHOCYTES NFR BLD: 34.4 % (ref 22–41)
MAGNESIUM SERPL-MCNC: 2 MG/DL (ref 1.5–2.5)
MCH RBC QN AUTO: 31.5 PG (ref 27–33)
MCHC RBC AUTO-ENTMCNC: 32.9 G/DL (ref 33.7–35.3)
MCV RBC AUTO: 95.8 FL (ref 81.4–97.8)
MONOCYTES # BLD AUTO: 0.7 K/UL (ref 0–0.85)
MONOCYTES NFR BLD AUTO: 12.2 % (ref 0–13.4)
NEUTROPHILS # BLD AUTO: 2.79 K/UL (ref 1.82–7.42)
NEUTROPHILS NFR BLD: 48.8 % (ref 44–72)
NRBC # BLD AUTO: 0 K/UL
NRBC BLD-RTO: 0 /100 WBC
PLATELET # BLD AUTO: 260 K/UL (ref 164–446)
PMV BLD AUTO: 9.2 FL (ref 9–12.9)
POTASSIUM SERPL-SCNC: 4 MMOL/L (ref 3.6–5.5)
PROT SERPL-MCNC: 7 G/DL (ref 6–8.2)
RBC # BLD AUTO: 4.98 M/UL (ref 4.7–6.1)
SODIUM SERPL-SCNC: 139 MMOL/L (ref 135–145)
TRIGL SERPL-MCNC: 94 MG/DL (ref 0–149)
TSH SERPL DL<=0.005 MIU/L-ACNC: 2.74 UIU/ML (ref 0.38–5.33)
VIT B12 SERPL-MCNC: 1147 PG/ML (ref 211–911)
WBC # BLD AUTO: 5.7 K/UL (ref 4.8–10.8)

## 2021-07-27 PROCEDURE — 80053 COMPREHEN METABOLIC PANEL: CPT

## 2021-07-27 PROCEDURE — 36415 COLL VENOUS BLD VENIPUNCTURE: CPT

## 2021-07-27 PROCEDURE — 82306 VITAMIN D 25 HYDROXY: CPT

## 2021-07-27 PROCEDURE — 83525 ASSAY OF INSULIN: CPT

## 2021-07-27 PROCEDURE — 80061 LIPID PANEL: CPT

## 2021-07-27 PROCEDURE — 86141 C-REACTIVE PROTEIN HS: CPT

## 2021-07-27 PROCEDURE — 83036 HEMOGLOBIN GLYCOSYLATED A1C: CPT

## 2021-07-27 PROCEDURE — 82607 VITAMIN B-12: CPT

## 2021-07-27 PROCEDURE — 83735 ASSAY OF MAGNESIUM: CPT

## 2021-07-27 PROCEDURE — 84443 ASSAY THYROID STIM HORMONE: CPT

## 2021-07-27 PROCEDURE — 85025 COMPLETE CBC W/AUTO DIFF WBC: CPT

## 2021-07-28 LAB
EST. AVERAGE GLUCOSE BLD GHB EST-MCNC: 108 MG/DL
HBA1C MFR BLD: 5.4 % (ref 4–5.6)

## 2021-07-29 LAB — INSULIN P FAST SERPL-ACNC: 3 UIU/ML (ref 3–19)

## 2021-08-03 ENCOUNTER — APPOINTMENT (OUTPATIENT)
Dept: MEDICAL GROUP | Facility: PHYSICIAN GROUP | Age: 46
End: 2021-08-03
Payer: COMMERCIAL

## 2021-08-11 ENCOUNTER — OFFICE VISIT (OUTPATIENT)
Dept: MEDICAL GROUP | Facility: PHYSICIAN GROUP | Age: 46
End: 2021-08-11
Payer: COMMERCIAL

## 2021-08-11 VITALS
WEIGHT: 160.9 LBS | SYSTOLIC BLOOD PRESSURE: 108 MMHG | TEMPERATURE: 98.1 F | OXYGEN SATURATION: 95 % | BODY MASS INDEX: 24.38 KG/M2 | DIASTOLIC BLOOD PRESSURE: 60 MMHG | HEIGHT: 68 IN | HEART RATE: 72 BPM

## 2021-08-11 DIAGNOSIS — E78.2 MIXED HYPERLIPIDEMIA: ICD-10-CM

## 2021-08-11 DIAGNOSIS — Z00.00 HEALTH CARE MAINTENANCE: ICD-10-CM

## 2021-08-11 PROCEDURE — 99213 OFFICE O/P EST LOW 20 MIN: CPT | Performed by: INTERNAL MEDICINE

## 2021-08-11 RX ORDER — AMOXICILLIN 500 MG/1
CAPSULE ORAL
COMMUNITY
Start: 2021-07-22 | End: 2021-08-11

## 2021-08-11 ASSESSMENT — FIBROSIS 4 INDEX: FIB4 SCORE: 1.42

## 2021-08-11 ASSESSMENT — PATIENT HEALTH QUESTIONNAIRE - PHQ9: CLINICAL INTERPRETATION OF PHQ2 SCORE: 0

## 2021-08-11 NOTE — PROGRESS NOTES
"Established Patient    Chief Complaint   Patient presents with   • Lab Results       Subjective:     HPI:   Barrett presents today with the following.    1. Mixed hyperlipidemia  2. Health care maintenance  Patient recently had blood work, CBC with normal range, CMP with normal range, lipid panel is acceptable for his age, denies any history of cardiovascular disease, patient otherwise is physically active and athletic, he is lifting weight approximately 4 times a week, patient otherwise denies any other related symptoms    Patient Active Problem List    Diagnosis Date Noted   • Alcohol use 06/24/2021   • Chronic motor tic 08/13/2019   • Biceps tendon rupture, left, initial encounter 12/04/2017   • Low libido 09/25/2017   • Marijuana use, continuous 09/25/2017   • Erectile dysfunction 07/31/2013   • Hyperlipidemia 07/31/2013       Current Outpatient Medications on File Prior to Visit   Medication Sig Dispense Refill   • sildenafil citrate (VIAGRA) 100 MG tablet TAKE 1 TABLET BY MOUTH AS NEEDED FOR  ERECTILE  DYSFUNCTION  *NEED  TO  MAKE  APPT  PRIOR  TO  MORE  REFILLS* 10 tablet 5   • Multiple Vitamins-Minerals (MULTIVITAMIN ADULT PO) Take  by mouth every day.       No current facility-administered medications on file prior to visit.       Allergies, past medical history, past surgical history, family history, social history reviewed and updated    ROS:  All other systems reviewed and are negative except as stated in the HPI     Physical Exam:     /60 (BP Location: Left arm, Patient Position: Sitting, BP Cuff Size: Large adult)   Pulse 72   Temp 36.7 °C (98.1 °F) (Temporal)   Ht 1.727 m (5' 8\")   Wt 73 kg (160 lb 14.4 oz)   SpO2 95%   BMI 24.46 kg/m²   General: Normal appearing. No distress.  ENT: oropharynx without exudates.    Eyes: conjunctiva clear lids without ptosis  Pulmonary: Clear to ausculation.  Normal effort.   Cardiovascular: Regular rate and rhythm  Abdomen: Soft, nontender,  Lymph: No " cervical or supraclavicular palpable lymph nodes  Psych: Normal mood and affect.     I have reviewed pertinent labs and diagnostic tests associated with this visit with specific comments listed under the assessment and plan below      Assessment and Plan:     46 y.o. male with the following issues.    1. Mixed hyperlipidemia  2. Health care maintenance  -discussion regarding healthy dietary options including plenty of vegetable, avoid sugars, regular exercise as tolerated, healthy fat/protein/carbs    Follow Up:      Return in about 1 year (around 8/11/2022).    Please note that this dictation was created using voice recognition software. I have made every reasonable attempt to correct obvious errors, but I expect that there are errors of grammar and possibly content that I did not discover before finalizing the note.    Signed by: Nanci Christine M.D.

## 2022-08-11 DIAGNOSIS — N52.9 ERECTILE DYSFUNCTION, UNSPECIFIED ERECTILE DYSFUNCTION TYPE: ICD-10-CM

## 2022-08-11 RX ORDER — SILDENAFIL 100 MG/1
TABLET, FILM COATED ORAL
Qty: 10 TABLET | Refills: 0 | Status: SHIPPED | OUTPATIENT
Start: 2022-08-11 | End: 2023-01-17

## 2022-09-19 ENCOUNTER — OFFICE VISIT (OUTPATIENT)
Dept: DERMATOLOGY | Facility: IMAGING CENTER | Age: 47
End: 2022-09-19
Payer: COMMERCIAL

## 2022-09-19 DIAGNOSIS — L81.4 LENTIGINES: ICD-10-CM

## 2022-09-19 DIAGNOSIS — D18.01 CHERRY ANGIOMA: ICD-10-CM

## 2022-09-19 DIAGNOSIS — I78.1 SPIDER ANGIOMA: ICD-10-CM

## 2022-09-19 DIAGNOSIS — D22.9 MULTIPLE NEVI: ICD-10-CM

## 2022-09-19 PROCEDURE — 99212 OFFICE O/P EST SF 10 MIN: CPT | Performed by: NURSE PRACTITIONER

## 2022-09-19 NOTE — PROGRESS NOTES
DERMATOLOGY NOTE  NEW VISIT       Chief complaint: Skin Lesion (Under right eye)       HPI/location: Under right eye  Time present: 1mo  Painful lesion: No  Itching lesion: No  Enlarging lesion: No      History of skin cancer: No  History of precancers/actinic keratoses: No  History of biopsies:No  History of blistering/severe sunburns:No  Family history of skin cancer:No  Family history of atypical moles:No      Allergies   Allergen Reactions    Nkda [No Known Drug Allergy]         MEDICATIONS:  Medications relevant to specialty reviewed.     REVIEW OF SYSTEMS:   Positive for skin (see HPI)  Negative for fevers and chills       EXAM:  There were no vitals taken for this visit.  Constitutional: Well-developed, well-nourished, and in no distress.     A focused skin exam was performed including the affected areas of the face. Notable findings on exam today listed below and/or in assessment/plan.     Spider angioma to R infraorbital region  -sun exposed skin of face with scattered clinically benign light brown reticulated macules all of which were morphologically similar and none of which were suspicious for skin cancer today on exam  Few scattered 1-3mm bright red macules and thin papules on the extremities  Multiple light brown tan and medium brown dark brown skin-colored macules papules scattered over the trunk >> extremities--with benign-appearing pigment network patterns on dermoscopy      IMPRESSION / PLAN:    1. Spider angioma  - Benign-appearing nature of lesions discussed during exam.   - Advised to continue to monitor for any return to clinic for new or concerning changes.      2. Lentigines  - Benign-appearing nature of lesions discussed during exam.   - Advised to continue to monitor for any return to clinic for new or concerning changes.      3. Multiple nevi  - Benign-appearing nature of lesions discussed during exam.   - Advised to continue to monitor for any return to clinic for new or concerning  changes.  - ABCDE's of melanoma discussed/handouts given      4. Cherry angioma  - Benign-appearing nature of lesions discussed during exam.   - Advised to continue to monitor for any return to clinic for new or concerning changes.    Patient verbalized understanding and agrees with plan regarding the above            Please note that this dictation was created using voice recognition software. I have made every reasonable attempt to correct obvious errors, but I expect that there are errors of grammar and possibly content that I did not discover before finalizing the note.      Return to clinic in: Return for PRN. and as needed for any new or changing skin lesions.

## 2023-01-13 DIAGNOSIS — N52.9 ERECTILE DYSFUNCTION, UNSPECIFIED ERECTILE DYSFUNCTION TYPE: ICD-10-CM

## 2023-01-13 NOTE — TELEPHONE ENCOUNTER
Received request via: Pharmacy    Was the patient seen in the last year in this department? No    Does the patient have an active prescription (recently filled or refills available) for medication(s) requested? No    Does the patient have intermediate Plus and need 100 day supply (blood pressure, diabetes and cholesterol meds only)? Patient does not have SCP

## 2023-01-17 RX ORDER — SILDENAFIL 100 MG/1
TABLET, FILM COATED ORAL
Qty: 10 TABLET | Refills: 0 | Status: SHIPPED | OUTPATIENT
Start: 2023-01-17 | End: 2023-04-06

## 2023-04-06 DIAGNOSIS — N52.9 ERECTILE DYSFUNCTION, UNSPECIFIED ERECTILE DYSFUNCTION TYPE: ICD-10-CM

## 2023-04-06 RX ORDER — SILDENAFIL 100 MG/1
TABLET, FILM COATED ORAL
Qty: 10 TABLET | Refills: 0 | Status: SHIPPED | OUTPATIENT
Start: 2023-04-06 | End: 2023-08-21 | Stop reason: SDUPTHER

## 2023-04-06 NOTE — TELEPHONE ENCOUNTER
Received request via: Pharmacy    Was the patient seen in the last year in this department? No    Does the patient have an active prescription (recently filled or refills available) for medication(s) requested? No    Does the patient have care home Plus and need 100 day supply (blood pressure, diabetes and cholesterol meds only)? Patient does not have SCP

## 2023-04-10 ENCOUNTER — APPOINTMENT (OUTPATIENT)
Dept: RADIOLOGY | Facility: IMAGING CENTER | Age: 48
End: 2023-04-10
Attending: INTERNAL MEDICINE
Payer: COMMERCIAL

## 2023-04-10 ENCOUNTER — OFFICE VISIT (OUTPATIENT)
Dept: MEDICAL GROUP | Facility: PHYSICIAN GROUP | Age: 48
End: 2023-04-10
Payer: COMMERCIAL

## 2023-04-10 VITALS
RESPIRATION RATE: 14 BRPM | WEIGHT: 166 LBS | HEART RATE: 72 BPM | TEMPERATURE: 99.2 F | SYSTOLIC BLOOD PRESSURE: 124 MMHG | HEIGHT: 68 IN | BODY MASS INDEX: 25.16 KG/M2 | OXYGEN SATURATION: 98 % | DIASTOLIC BLOOD PRESSURE: 62 MMHG

## 2023-04-10 DIAGNOSIS — E78.2 MIXED HYPERLIPIDEMIA: ICD-10-CM

## 2023-04-10 DIAGNOSIS — Z13.1 DIABETES MELLITUS SCREENING: ICD-10-CM

## 2023-04-10 DIAGNOSIS — R22.2 LUMP IN CHEST: ICD-10-CM

## 2023-04-10 DIAGNOSIS — Z13.21 ENCOUNTER FOR VITAMIN DEFICIENCY SCREENING: ICD-10-CM

## 2023-04-10 DIAGNOSIS — Z12.11 SCREEN FOR COLON CANCER: ICD-10-CM

## 2023-04-10 DIAGNOSIS — Z13.29 SCREENING FOR THYROID DISORDER: ICD-10-CM

## 2023-04-10 DIAGNOSIS — Z00.00 HEALTH CARE MAINTENANCE: ICD-10-CM

## 2023-04-10 DIAGNOSIS — E55.9 VITAMIN D DEFICIENCY: ICD-10-CM

## 2023-04-10 PROCEDURE — 99214 OFFICE O/P EST MOD 30 MIN: CPT | Performed by: INTERNAL MEDICINE

## 2023-04-10 PROCEDURE — 71046 X-RAY EXAM CHEST 2 VIEWS: CPT | Mod: TC | Performed by: STUDENT IN AN ORGANIZED HEALTH CARE EDUCATION/TRAINING PROGRAM

## 2023-04-10 PROCEDURE — 71100 X-RAY EXAM RIBS UNI 2 VIEWS: CPT | Mod: TC,RT | Performed by: STUDENT IN AN ORGANIZED HEALTH CARE EDUCATION/TRAINING PROGRAM

## 2023-04-10 ASSESSMENT — FIBROSIS 4 INDEX: FIB4 SCORE: 1.45

## 2023-04-10 ASSESSMENT — PATIENT HEALTH QUESTIONNAIRE - PHQ9: CLINICAL INTERPRETATION OF PHQ2 SCORE: 0

## 2023-04-10 NOTE — ADDENDUM NOTE
Addended by: JUAN FRANCISCO CHICAS on: 4/10/2023 02:54 PM     Modules accepted: Orders, Level of Service

## 2023-04-10 NOTE — PROGRESS NOTES
"Established Patient    Chief Complaint   Patient presents with    Follow-Up    Lump     Under r armpit     Requesting Labs       Subjective:     HPI:   Barrett presents today with the following.    Patient Active Problem List    Diagnosis Date Noted    Alcohol use 06/24/2021    Chronic motor tic 08/13/2019    Biceps tendon rupture, left, initial encounter 12/04/2017    Low libido 09/25/2017    Marijuana use, continuous 09/25/2017    Erectile dysfunction 07/31/2013    Hyperlipidemia 07/31/2013       Current Outpatient Medications on File Prior to Visit   Medication Sig Dispense Refill    sildenafil citrate (VIAGRA) 100 MG tablet TAKE 1 TABLET BY MOUTH AS NEEDED FOR ERECTILE DYSFUNCTION . APPOINTMENT REQUIRED FOR FUTURE REFILLS 10 Tablet 0    Multiple Vitamins-Minerals (MULTIVITAMIN ADULT PO) Take  by mouth every day.       No current facility-administered medications on file prior to visit.       Allergies, past medical history, past surgical history, family history, social history reviewed and updated    ROS:  All other systems reviewed and are negative except as stated in the HPI       Physical Exam:     /62 (BP Location: Left arm, Patient Position: Sitting, BP Cuff Size: Large adult)   Pulse 72   Temp 37.3 °C (99.2 °F)   Resp 14   Ht 1.727 m (5' 8\")   Wt 75.3 kg (166 lb)   SpO2 98%   BMI 25.24 kg/m²   General: Normal appearing. No distress.  Pulmonary: Clear to ausculation.  Normal effort.   Cardiovascular: Regular rate and rhythm    Assessment and Plan:     47 y.o. male with the following issues.    1. Lump in chest  Patient has a lump, Right-sided, mid axillary level, at the level of the third or fourth rib possibly, firm mass, possibly attached to the rib, reported history of radiation to that area due to having cancer at the age of 1 year, patient not sure about the nature of the tumor that he had, however ended up having some radiation and surgery, patient has some discrepancy of the pectoralis " muscles which is in less mass compared to the left side.  Patient otherwise denies any chest pain, shortness of breath, patient is physically active with weightlifting without issues.  Lump is only palpable without tenderness or pain or signs of infection.  - Differential including fibrous tissue versus other pathologies.  We will start with basic imaging and possibly need future CT scan with biopsy for further diagnostic purposes  - DX-CHEST-2 VIEWS; Future  - [id number] (WITH 1-VIEW CXR) RIGHT; Future  - US-CHEST; Future      1. Lump in chest  - DX-CHEST-2 VIEWS; Future  - US-CHEST; Future  - [id number] (W/O CXR) RIGHT; Future  - Comp Metabolic Panel; Future  - TSH WITH REFLEX TO FT4; Future    2. Mixed hyperlipidemia  - Lipid Profile; Future    3. Screening for thyroid disorder  - TSH WITH REFLEX TO FT4; Future    4. Diabetes mellitus screening  - HEMOGLOBIN A1C; Future    5. Health care maintenance  - Comp Metabolic Panel; Future  - VITAMIN D,25 HYDROXY (DEFICIENCY); Future    6. Screen for colon cancer  - Referral to GI for Colonoscopy    8. Encounter for vitamin deficiency screening  - VITAMIN D,25 HYDROXY (DEFICIENCY); Future      Please note that this dictation was created using voice recognition software. I have made every reasonable attempt to correct obvious errors, but I expect that there are errors of grammar and possibly content that I did not discover before finalizing the note.    Signed by: Nanci Christine M.D.

## 2023-04-18 ENCOUNTER — HOSPITAL ENCOUNTER (OUTPATIENT)
Dept: LAB | Facility: MEDICAL CENTER | Age: 48
End: 2023-04-18
Attending: INTERNAL MEDICINE
Payer: COMMERCIAL

## 2023-04-18 ENCOUNTER — HOSPITAL ENCOUNTER (OUTPATIENT)
Dept: RADIOLOGY | Facility: MEDICAL CENTER | Age: 48
End: 2023-04-18
Attending: INTERNAL MEDICINE
Payer: COMMERCIAL

## 2023-04-18 DIAGNOSIS — Z13.1 DIABETES MELLITUS SCREENING: ICD-10-CM

## 2023-04-18 DIAGNOSIS — R22.2 LUMP IN CHEST: ICD-10-CM

## 2023-04-18 DIAGNOSIS — Z13.21 ENCOUNTER FOR VITAMIN DEFICIENCY SCREENING: ICD-10-CM

## 2023-04-18 DIAGNOSIS — Z00.00 HEALTH CARE MAINTENANCE: ICD-10-CM

## 2023-04-18 DIAGNOSIS — E78.2 MIXED HYPERLIPIDEMIA: ICD-10-CM

## 2023-04-18 DIAGNOSIS — Z13.29 SCREENING FOR THYROID DISORDER: ICD-10-CM

## 2023-04-18 LAB
EST. AVERAGE GLUCOSE BLD GHB EST-MCNC: 108 MG/DL
HBA1C MFR BLD: 5.4 % (ref 4–5.6)

## 2023-04-18 PROCEDURE — 83036 HEMOGLOBIN GLYCOSYLATED A1C: CPT

## 2023-04-18 PROCEDURE — 36415 COLL VENOUS BLD VENIPUNCTURE: CPT

## 2023-04-18 PROCEDURE — 84443 ASSAY THYROID STIM HORMONE: CPT

## 2023-04-18 PROCEDURE — 76604 US EXAM CHEST: CPT

## 2023-04-18 PROCEDURE — 80053 COMPREHEN METABOLIC PANEL: CPT

## 2023-04-18 PROCEDURE — 80061 LIPID PANEL: CPT

## 2023-04-18 PROCEDURE — 82306 VITAMIN D 25 HYDROXY: CPT

## 2023-04-19 LAB
25(OH)D3 SERPL-MCNC: 32 NG/ML (ref 30–100)
ALBUMIN SERPL BCP-MCNC: 4.4 G/DL (ref 3.2–4.9)
ALBUMIN/GLOB SERPL: 2 G/DL
ALP SERPL-CCNC: 67 U/L (ref 30–99)
ALT SERPL-CCNC: 18 U/L (ref 2–50)
ANION GAP SERPL CALC-SCNC: 8 MMOL/L (ref 7–16)
AST SERPL-CCNC: 28 U/L (ref 12–45)
BILIRUB SERPL-MCNC: 0.4 MG/DL (ref 0.1–1.5)
BUN SERPL-MCNC: 17 MG/DL (ref 8–22)
CALCIUM ALBUM COR SERPL-MCNC: 8.9 MG/DL (ref 8.5–10.5)
CALCIUM SERPL-MCNC: 9.2 MG/DL (ref 8.5–10.5)
CHLORIDE SERPL-SCNC: 106 MMOL/L (ref 96–112)
CHOLEST SERPL-MCNC: 160 MG/DL (ref 100–199)
CO2 SERPL-SCNC: 26 MMOL/L (ref 20–33)
CREAT SERPL-MCNC: 0.96 MG/DL (ref 0.5–1.4)
FASTING STATUS PATIENT QL REPORTED: NORMAL
GFR SERPLBLD CREATININE-BSD FMLA CKD-EPI: 98 ML/MIN/1.73 M 2
GLOBULIN SER CALC-MCNC: 2.2 G/DL (ref 1.9–3.5)
GLUCOSE SERPL-MCNC: 88 MG/DL (ref 65–99)
HDLC SERPL-MCNC: 57 MG/DL
LDLC SERPL CALC-MCNC: 91 MG/DL
POTASSIUM SERPL-SCNC: 4.6 MMOL/L (ref 3.6–5.5)
PROT SERPL-MCNC: 6.6 G/DL (ref 6–8.2)
SODIUM SERPL-SCNC: 140 MMOL/L (ref 135–145)
TRIGL SERPL-MCNC: 61 MG/DL (ref 0–149)
TSH SERPL DL<=0.005 MIU/L-ACNC: 1.79 UIU/ML (ref 0.38–5.33)

## 2023-05-26 ENCOUNTER — PATIENT MESSAGE (OUTPATIENT)
Dept: HEALTH INFORMATION MANAGEMENT | Facility: OTHER | Age: 48
End: 2023-05-26

## 2023-07-28 ENCOUNTER — TELEPHONE (OUTPATIENT)
Dept: HEALTH INFORMATION MANAGEMENT | Facility: OTHER | Age: 48
End: 2023-07-28
Payer: COMMERCIAL

## 2023-08-21 ENCOUNTER — OFFICE VISIT (OUTPATIENT)
Dept: MEDICAL GROUP | Facility: MEDICAL CENTER | Age: 48
End: 2023-08-21
Payer: COMMERCIAL

## 2023-08-21 VITALS
SYSTOLIC BLOOD PRESSURE: 126 MMHG | HEART RATE: 71 BPM | RESPIRATION RATE: 16 BRPM | DIASTOLIC BLOOD PRESSURE: 62 MMHG | WEIGHT: 175 LBS | OXYGEN SATURATION: 97 % | TEMPERATURE: 97.3 F | BODY MASS INDEX: 26.52 KG/M2 | HEIGHT: 68 IN

## 2023-08-21 DIAGNOSIS — N52.9 ERECTILE DYSFUNCTION, UNSPECIFIED ERECTILE DYSFUNCTION TYPE: ICD-10-CM

## 2023-08-21 DIAGNOSIS — Z00.00 ENCOUNTER FOR MEDICAL EXAMINATION TO ESTABLISH CARE: ICD-10-CM

## 2023-08-21 DIAGNOSIS — E78.2 MIXED HYPERLIPIDEMIA: ICD-10-CM

## 2023-08-21 DIAGNOSIS — Z23 IMMUNIZATION DUE: ICD-10-CM

## 2023-08-21 PROBLEM — S46.212A BICEPS TENDON RUPTURE, LEFT, INITIAL ENCOUNTER: Status: RESOLVED | Noted: 2017-12-04 | Resolved: 2023-08-21

## 2023-08-21 PROBLEM — R68.82 LOW LIBIDO: Status: RESOLVED | Noted: 2017-09-25 | Resolved: 2023-08-21

## 2023-08-21 PROBLEM — R22.2 LUMP IN CHEST: Status: RESOLVED | Noted: 2023-04-10 | Resolved: 2023-08-21

## 2023-08-21 PROCEDURE — 3074F SYST BP LT 130 MM HG: CPT | Performed by: STUDENT IN AN ORGANIZED HEALTH CARE EDUCATION/TRAINING PROGRAM

## 2023-08-21 PROCEDURE — 90746 HEPB VACCINE 3 DOSE ADULT IM: CPT | Performed by: STUDENT IN AN ORGANIZED HEALTH CARE EDUCATION/TRAINING PROGRAM

## 2023-08-21 PROCEDURE — 90471 IMMUNIZATION ADMIN: CPT | Performed by: STUDENT IN AN ORGANIZED HEALTH CARE EDUCATION/TRAINING PROGRAM

## 2023-08-21 PROCEDURE — 3078F DIAST BP <80 MM HG: CPT | Performed by: STUDENT IN AN ORGANIZED HEALTH CARE EDUCATION/TRAINING PROGRAM

## 2023-08-21 PROCEDURE — 99204 OFFICE O/P NEW MOD 45 MIN: CPT | Mod: 25 | Performed by: STUDENT IN AN ORGANIZED HEALTH CARE EDUCATION/TRAINING PROGRAM

## 2023-08-21 RX ORDER — SILDENAFIL 100 MG/1
TABLET, FILM COATED ORAL
Qty: 10 TABLET | Refills: 6 | Status: SHIPPED | OUTPATIENT
Start: 2023-08-21

## 2023-08-21 NOTE — PROGRESS NOTES
"Subjective:     CC:  Diagnoses of Encounter for medical examination to establish care, Erectile dysfunction, unspecified erectile dysfunction type, Mixed hyperlipidemia, and Immunization due were pertinent to this visit.    HISTORY OF THE PRESENT ILLNESS: Patient is a 48 y.o. male. This pleasant patient is here today to establish care and discuss the following    Problem   Erectile Dysfunction    This is a chronic condition.  Currently well controlled with Viagra 100 mg daily as needed.     Hyperlipidemia    This is a chronic condition.  On last labs it had improved and is now in the normal range.  He is working on healthy diet and exercise.     Lump in Chest (Resolved)   Biceps Tendon Rupture, Left, Initial Encounter (Resolved)   Low Libido (Resolved)     ROS:   ROS      Objective:     Exam: /62   Pulse 71   Temp 36.3 °C (97.3 °F) (Temporal)   Resp 16   Ht 1.727 m (5' 8\")   Wt 79.4 kg (175 lb)   SpO2 97%  Body mass index is 26.61 kg/m².    Physical Exam  Vitals reviewed.   Constitutional:       General: He is not in acute distress.     Appearance: He is not toxic-appearing.   HENT:      Head: Normocephalic and atraumatic.      Right Ear: External ear normal.      Left Ear: External ear normal.   Eyes:      General:         Right eye: No discharge.         Left eye: No discharge.      Extraocular Movements: Extraocular movements intact.      Conjunctiva/sclera: Conjunctivae normal.   Cardiovascular:      Rate and Rhythm: Normal rate and regular rhythm.      Heart sounds: Normal heart sounds. No murmur heard.     No gallop.   Pulmonary:      Effort: Pulmonary effort is normal. No respiratory distress.      Breath sounds: Normal breath sounds. No wheezing or rales.   Skin:     General: Skin is warm and dry.   Neurological:      Mental Status: He is alert.   Psychiatric:         Mood and Affect: Mood normal.         Behavior: Behavior normal.         Thought Content: Thought content normal.         " Judgment: Judgment normal.         Assessment & Plan:   48 y.o. male with the following -    1. Encounter for medical examination to establish care  History, problem list, medications and allergies reviewed.  Records requested from previous provider if applicable.    2. Erectile dysfunction, unspecified erectile dysfunction type  Stable, continue Viagra at current dosing  - sildenafil citrate (VIAGRA) 100 MG tablet; Take one as needed prior to intercourse, MDD 1 tablet  Dispense: 10 Tablet; Refill: 6    3. Mixed hyperlipidemia  Chronic, currently at goal, reviewed previous labs and discussed dietary modifications that will help and stay at goal.  We also discussed regular exercise.    4. Immunization due  - Hepatitis B Vaccine Adult IM    No follow-ups on file.    Please note that this dictation was created using voice recognition software. I have made every reasonable attempt to correct obvious errors, but I expect that there are errors of grammar and possibly content that I did not discover before finalizing the note.

## 2023-09-19 ENCOUNTER — NON-PROVIDER VISIT (OUTPATIENT)
Dept: MEDICAL GROUP | Facility: MEDICAL CENTER | Age: 48
End: 2023-09-19
Payer: COMMERCIAL

## 2023-09-19 DIAGNOSIS — Z23 IMMUNIZATION DUE: ICD-10-CM

## 2023-09-22 PROCEDURE — 90471 IMMUNIZATION ADMIN: CPT | Performed by: FAMILY MEDICINE

## 2023-09-22 PROCEDURE — 90746 HEPB VACCINE 3 DOSE ADULT IM: CPT | Performed by: FAMILY MEDICINE

## 2023-09-22 NOTE — PROGRESS NOTES
"Barrett Goldstein is a 48 y.o. male here for a non-provider visit for:   HEPATITIS B 2 of 3    Reason for immunization: continue or complete series started at the office  Immunization records indicate need for vaccine: No  Minimum interval has been met for this vaccine: No  ABN completed: No    VIS Dated  2023 was given to patient:  All IAC Questionnaire questions were answered \"No.\"    Patient tolerated injection and no adverse effects were observed or reported: Yes    Pt scheduled for next dose in series: Yes   "

## 2024-01-22 ENCOUNTER — APPOINTMENT (OUTPATIENT)
Dept: MEDICAL GROUP | Facility: MEDICAL CENTER | Age: 49
End: 2024-01-22
Payer: COMMERCIAL

## 2024-01-23 ENCOUNTER — OFFICE VISIT (OUTPATIENT)
Dept: MEDICAL GROUP | Facility: MEDICAL CENTER | Age: 49
End: 2024-01-23
Payer: COMMERCIAL

## 2024-01-23 VITALS
OXYGEN SATURATION: 99 % | SYSTOLIC BLOOD PRESSURE: 126 MMHG | HEART RATE: 87 BPM | DIASTOLIC BLOOD PRESSURE: 54 MMHG | TEMPERATURE: 98.3 F | BODY MASS INDEX: 25.91 KG/M2 | HEIGHT: 68 IN | WEIGHT: 171 LBS | RESPIRATION RATE: 16 BRPM

## 2024-01-23 DIAGNOSIS — R22.1 MASS IN NECK: ICD-10-CM

## 2024-01-23 PROCEDURE — 3078F DIAST BP <80 MM HG: CPT | Performed by: STUDENT IN AN ORGANIZED HEALTH CARE EDUCATION/TRAINING PROGRAM

## 2024-01-23 PROCEDURE — 3074F SYST BP LT 130 MM HG: CPT | Performed by: STUDENT IN AN ORGANIZED HEALTH CARE EDUCATION/TRAINING PROGRAM

## 2024-01-23 PROCEDURE — 99214 OFFICE O/P EST MOD 30 MIN: CPT | Performed by: STUDENT IN AN ORGANIZED HEALTH CARE EDUCATION/TRAINING PROGRAM

## 2024-01-24 NOTE — PROGRESS NOTES
"Subjective:     CC: Neck mass    HPI:   Barrett presents today with    Problem   Mass in Neck    This is a chronic condition has been going on for several months.  He has a lump in the front of his neck below his thyroid.  It does not interfere with swallowing or breathing.  He does not feel like it is getting bigger.  It is largely asymptomatic.         ROS:  ROS    Objective:     Exam:  /54   Pulse 87   Temp 36.8 °C (98.3 °F) (Temporal)   Resp 16   Ht 1.727 m (5' 8\")   Wt 77.6 kg (171 lb)   SpO2 99%   BMI 26.00 kg/m²  Body mass index is 26 kg/m².    Physical Exam  Vitals reviewed.   Constitutional:       General: He is not in acute distress.     Appearance: He is not toxic-appearing.   HENT:      Head: Normocephalic and atraumatic.      Right Ear: External ear normal.      Left Ear: External ear normal.   Eyes:      General:         Right eye: No discharge.         Left eye: No discharge.      Extraocular Movements: Extraocular movements intact.      Conjunctiva/sclera: Conjunctivae normal.   Neck:      Comments: 2 cm nonmobile firm mass midline in the neck below the thyroid  Pulmonary:      Effort: Pulmonary effort is normal. No respiratory distress.   Musculoskeletal:      Cervical back: Normal range of motion and neck supple. No rigidity or tenderness.   Skin:     General: Skin is warm and dry.   Neurological:      Mental Status: He is alert.   Psychiatric:         Mood and Affect: Mood normal.         Behavior: Behavior normal.         Thought Content: Thought content normal.         Judgment: Judgment normal.           Assessment & Plan:     48 y.o. male with the following -     1. Mass in neck  Unclear diagnosis, get ultrasound soft tissue to evaluate  - US-SOFT TISSUES OF HEAD - NECK; Future    No follow-ups on file.    Please note that this dictation was created using voice recognition software. I have made every reasonable attempt to correct obvious errors, but I expect that there are errors of " grammar and possibly content that I did not discover before finalizing the note.

## 2024-01-30 ENCOUNTER — HOSPITAL ENCOUNTER (OUTPATIENT)
Dept: RADIOLOGY | Facility: MEDICAL CENTER | Age: 49
End: 2024-01-30
Attending: STUDENT IN AN ORGANIZED HEALTH CARE EDUCATION/TRAINING PROGRAM
Payer: COMMERCIAL

## 2024-01-30 DIAGNOSIS — R22.1 MASS IN NECK: ICD-10-CM

## 2024-01-30 PROCEDURE — 76536 US EXAM OF HEAD AND NECK: CPT

## 2024-01-31 DIAGNOSIS — R93.89 ABNORMAL ULTRASOUND: ICD-10-CM

## 2024-01-31 DIAGNOSIS — R22.1 MASS IN NECK: ICD-10-CM

## 2024-02-12 ENCOUNTER — HOSPITAL ENCOUNTER (OUTPATIENT)
Dept: HEMATOLOGY ONCOLOGY | Facility: MEDICAL CENTER | Age: 49
End: 2024-02-12
Attending: NURSE PRACTITIONER
Payer: COMMERCIAL

## 2024-02-12 VITALS
DIASTOLIC BLOOD PRESSURE: 52 MMHG | RESPIRATION RATE: 16 BRPM | SYSTOLIC BLOOD PRESSURE: 112 MMHG | OXYGEN SATURATION: 98 % | TEMPERATURE: 98.1 F | BODY MASS INDEX: 24.33 KG/M2 | HEIGHT: 70 IN | WEIGHT: 169.97 LBS | HEART RATE: 78 BPM

## 2024-02-12 DIAGNOSIS — R22.1 MASS IN NECK: ICD-10-CM

## 2024-02-12 PROCEDURE — 99212 OFFICE O/P EST SF 10 MIN: CPT | Performed by: NURSE PRACTITIONER

## 2024-02-12 PROCEDURE — 99204 OFFICE O/P NEW MOD 45 MIN: CPT | Performed by: NURSE PRACTITIONER

## 2024-02-12 ASSESSMENT — ENCOUNTER SYMPTOMS
FEVER: 0
DIZZINESS: 0
MYALGIAS: 0
SHORTNESS OF BREATH: 0
HEADACHES: 0
WEIGHT LOSS: 0
DIAPHORESIS: 0
NAUSEA: 0
COUGH: 0
VOMITING: 0
DIARRHEA: 0
CHILLS: 0
SORE THROAT: 0
PALPITATIONS: 0
CONSTIPATION: 0

## 2024-02-12 ASSESSMENT — PAIN SCALES - GENERAL: PAINLEVEL: NO PAIN

## 2024-02-12 ASSESSMENT — PATIENT HEALTH QUESTIONNAIRE - PHQ9: CLINICAL INTERPRETATION OF PHQ2 SCORE: 0

## 2024-02-12 NOTE — PROGRESS NOTES
Subjective     Barrett Goldstein is a 48 y.o. male who presents with New Patient (IOC/Mass in neck /ALYSON CRISTOBAL)          HPI    Patient referred to me, Intake Oncology Coordinator by his PCP Dr. Cristobal for neck mass.  Patient is accompanied by his wife for today's visit.    Patient noted 1 day when he was getting ready mass in the middle of his neck.  He stated that when looking back at pictures they were able to see this even a year ago.  He denies any symptoms including difficulty swallowing, pain, change in voice.  He went and saw his PCP who recommended an ultrasound.  This was completed on 1/30/2024 which showed a 1 cm complex nodule in the neck area of palpable concern that abuts the thyroid gland.  Radiologist stated it may be an exophytic thyroid nodule or less likely a thyroid cyst.  I did personally review the imaging report in detail and reviewed with the patient and his wife today.    Patient clinically denies any symptoms whatsoever.    Please see past medical and surgical history below.  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  He did have it surgically removed and underwent radiation to this area as an infant.    Patient denies any family history of cancer.    Patient is a never cigarette smoker.    Allergies   Allergen Reactions    Nkda [No Known Drug Allergy]      Current Outpatient Medications on File Prior to Encounter   Medication Sig Dispense Refill    sildenafil citrate (VIAGRA) 100 MG tablet Take one as needed prior to intercourse, MDD 1 tablet 10 Tablet 6    Multiple Vitamins-Minerals (MULTIVITAMIN ADULT PO) Take  by mouth every day.       No current facility-administered medications on file prior to encounter.     Past Medical History:   Diagnosis Date    Cancer (HCC) 1974    Tumor excised from axillae and then radiation tx.    Elevated liver enzymes 7/31/2013    Erectile dysfunction     Heart murmur age 15    No treatment.    High cholesterol  "12/04/2017     No meds.    Hyperlipidemia     Marijuana use     daily    Pain 12/04/2017    Positional pain.     Past Surgical History:   Procedure Laterality Date    BICEPS TENDON REPAIR Left 12/4/2017    Procedure: BICEPS TENDON REPAIR;  Surgeon: Erich Julio M.D.;  Location: SURGERY [facility];  Service: Orthopedics    VASECTOMY Bilateral 2008    NO sedation    OTHER SURGICAL PROCEDURE Right 1974    CA tumor excised from axillae     Family History   Problem Relation Age of Onset    Alcohol/Drug Father     Heart Disease Father     Diabetes Neg Hx     Hyperlipidemia Neg Hx     Cancer Neg Hx      Social History     Socioeconomic History    Marital status:    Tobacco Use    Smoking status: Never    Smokeless tobacco: Never   Vaping Use    Vaping Use: Never used   Substance and Sexual Activity    Alcohol use: Yes     Alcohol/week: 7.2 oz     Types: 12 Cans of beer per week     Comment: 8-10 drinks on weekend.    Drug use: Yes     Types: Marijuana, Inhaled     Comment: daily    Sexual activity: Yes     Partners: Female     Birth control/protection: Surgical     Comment: vasectomy         Review of Systems   Constitutional:  Negative for chills, diaphoresis, fever, malaise/fatigue and weight loss.   HENT:  Negative for sore throat.    Respiratory:  Negative for cough and shortness of breath.    Cardiovascular:  Negative for chest pain and palpitations.   Gastrointestinal:  Negative for constipation, diarrhea, nausea and vomiting.   Genitourinary:  Negative for dysuria and hematuria.   Musculoskeletal:  Negative for myalgias.   Neurological:  Negative for dizziness and headaches.              Objective     /52 (BP Location: Right arm, Patient Position: Sitting, BP Cuff Size: Adult)   Pulse 78   Temp 36.7 °C (98.1 °F) (Temporal)   Resp 16   Ht 1.776 m (5' 9.92\")   Wt 77.1 kg (169 lb 15.6 oz)   SpO2 98%   BMI 24.44 kg/m²      Physical Exam  Vitals reviewed.   Constitutional:       General: He " Event Note is not in acute distress.     Appearance: Normal appearance. He is not diaphoretic.   HENT:      Head: Normocephalic and atraumatic.   Neck:        Comments: Approx 1 cm nodule noted on physical exam  Cardiovascular:      Rate and Rhythm: Normal rate and regular rhythm.      Heart sounds: Normal heart sounds. No murmur heard.     No friction rub. No gallop.   Pulmonary:      Effort: Pulmonary effort is normal. No respiratory distress.      Breath sounds: Normal breath sounds. No wheezing.   Musculoskeletal:         General: No swelling or tenderness. Normal range of motion.      Cervical back: No pain with movement.   Lymphadenopathy:      Head:      Right side of head: No submental, submandibular, tonsillar, preauricular, posterior auricular or occipital adenopathy.      Left side of head: No submental, submandibular, tonsillar, preauricular, posterior auricular or occipital adenopathy.      Cervical: No cervical adenopathy.      Right cervical: No superficial, deep or posterior cervical adenopathy.     Left cervical: No superficial, deep or posterior cervical adenopathy.      Upper Body:      Right upper body: No supraclavicular adenopathy.      Left upper body: No supraclavicular adenopathy.   Skin:     General: Skin is warm.   Neurological:      Mental Status: He is alert and oriented to person, place, and time.   Psychiatric:         Mood and Affect: Mood normal.         Behavior: Behavior normal.               US-SOFT TISSUES OF HEAD - NECK    Result Date: 1/30/2024 1/30/2024 1:25 PM HISTORY/REASON FOR EXAM:  Palpable area of concern in the neck near the midline TECHNIQUE/EXAM DESCRIPTION: Ultrasound of the soft tissues of the head and neck. COMPARISON:  None FINDINGS: Focused ultrasound of the area of concern in the anterior neck near the midline demonstrates a hypoechoic nodule measuring 1 x 0.7 cm. The mass contains simple cystic and hypoechoic components, but no internal vascular flow. It abuts the  thyroid gland. No suspicious lymph nodes are identified.     A 1 cm complex nodule in the neck in the area of palpable concern, abutting the thyroid gland. It may represent an exophytic thyroid nodule or, less likely, a complicated cyst. No internal vascularity. Consider fine-needle aspiration of this nodule.                 Assessment & Plan       1. Mass in neck  US-NEEDLE BX-SOFT TISSUE NECK-CHEST              There is a complex cystic nodule in the anterior neck.  I did review the imaging with Dr. Clark as well who recommended proceeding with an FNA biopsy as recommended per radiologist.  Discussed this with the patient and his wife today and he did verbalize understanding's and agreed with the plan.  I will have patient follow-up with me in the clinic after the biopsy is been completed to review the results and discuss further plan of care.      Please note that this dictation was created using voice recognition software. I have made every reasonable attempt to correct obvious errors, but I expect that there are errors of grammar and possibly content that I did not discover before finalizing the note.

## 2024-02-12 NOTE — ADDENDUM NOTE
Encounter addended by: Lo Saldana, Med Ass't on: 2/12/2024 3:13 PM   Actions taken: Charge Capture section accepted

## 2024-02-20 ENCOUNTER — HOSPITAL ENCOUNTER (OUTPATIENT)
Dept: RADIOLOGY | Facility: MEDICAL CENTER | Age: 49
End: 2024-02-20
Attending: NURSE PRACTITIONER
Payer: COMMERCIAL

## 2024-02-20 DIAGNOSIS — R22.1 MASS IN NECK: ICD-10-CM

## 2024-02-20 PROCEDURE — 88173 CYTOPATH EVAL FNA REPORT: CPT

## 2024-02-20 PROCEDURE — 88305 TISSUE EXAM BY PATHOLOGIST: CPT

## 2024-02-20 PROCEDURE — 10005 FNA BX W/US GDN 1ST LES: CPT

## 2024-02-21 LAB — CYTOLOGY REG CYTOL: NORMAL

## 2024-02-21 NOTE — PROGRESS NOTES
US guided right thyroid nodule fine needle aspiration done by Dr. Tai; NON-SEDATION (no H&P required as this is a NON SEDATION procedure) right anterior aspect of neck access site, dressing CDI; 2 passes in 1 jar of cytolyt obtained, 1 pass in 1 vial afirma obtained, 2 passes in 1 vial of RPMI and sent to lab. Pt tolerated the procedure well. Pt hemodynamically stable pre/intra/post procedure; all questions and concerns answered prior to being d/c; patient provided with appropriate education for procedure; pt d/c home.

## 2024-02-22 ENCOUNTER — APPOINTMENT (OUTPATIENT)
Dept: HEMATOLOGY ONCOLOGY | Facility: MEDICAL CENTER | Age: 49
End: 2024-02-22
Payer: COMMERCIAL

## 2024-02-26 ENCOUNTER — APPOINTMENT (OUTPATIENT)
Dept: MEDICAL GROUP | Facility: MEDICAL CENTER | Age: 49
End: 2024-02-26
Payer: COMMERCIAL

## 2024-02-26 ENCOUNTER — HOSPITAL ENCOUNTER (OUTPATIENT)
Dept: HEMATOLOGY ONCOLOGY | Facility: MEDICAL CENTER | Age: 49
End: 2024-02-26
Attending: NURSE PRACTITIONER
Payer: COMMERCIAL

## 2024-02-26 VITALS
DIASTOLIC BLOOD PRESSURE: 60 MMHG | WEIGHT: 173.39 LBS | RESPIRATION RATE: 17 BRPM | TEMPERATURE: 98.5 F | BODY MASS INDEX: 24.82 KG/M2 | OXYGEN SATURATION: 96 % | HEIGHT: 70 IN | HEART RATE: 67 BPM | SYSTOLIC BLOOD PRESSURE: 122 MMHG

## 2024-02-26 DIAGNOSIS — R22.1 MASS IN NECK: ICD-10-CM

## 2024-02-26 PROCEDURE — 99212 OFFICE O/P EST SF 10 MIN: CPT | Performed by: NURSE PRACTITIONER

## 2024-02-26 PROCEDURE — 99214 OFFICE O/P EST MOD 30 MIN: CPT | Performed by: NURSE PRACTITIONER

## 2024-02-26 ASSESSMENT — PAIN SCALES - GENERAL: PAINLEVEL: NO PAIN

## 2024-02-27 ASSESSMENT — ENCOUNTER SYMPTOMS
CHILLS: 0
WEIGHT LOSS: 0
FEVER: 0
SORE THROAT: 0

## 2024-02-27 NOTE — PROGRESS NOTES
Subjective     Barrett Goldstein is a 48 y.o. male who presents with Results ([place]/ IOC est/ follow up after BX)          HPI    Patient seen today in follow-up for biopsy results.  He presents unaccompanied for today's visit, however his wife was present on the phone.    Patient originally seen back on 2/12/2024 after referral from PCP for a neck mass. Patient noted 1 day when he was getting ready mass in the middle of his neck. He stated that when looking back at pictures they were able to see this even a year ago. He denies any symptoms including difficulty swallowing, pain, change in voice. He went and saw his PCP who recommended an ultrasound. This was completed on 1/30/2024 which showed a 1 cm complex nodule in the neck area of palpable concern that abuts the thyroid gland. Radiologist stated it may be an exophytic thyroid nodule or less likely a thyroid cyst.     Based on ultrasound findings I did recommend proceeding with biopsy which was completed on 2/20/2024.  Patient did state that he is doing well and tolerated the biopsy well with no clinical concerns.    Pathology does confirm blood and hemosiderin laden macrophages.  According to the pathologist if these findings were to drain the cyst and this would be adequate results.  However if this is concerning to be a discrete thyroid nodule and attempting to rule out a malignancy then this would be considered to be nondiagnostic.  I did personally review the pathology results in detail with Dr. Tika Reich, thyroid surgeon.  We discussed the possibility of having surgical consultation for further evaluation, as these findings are more consistent with a cyst.    I did discuss the pathology results in detail with the patient today.    Allergies   Allergen Reactions    Nkda [No Known Drug Allergy]      Current Outpatient Medications on File Prior to Encounter   Medication Sig Dispense Refill    sildenafil citrate (VIAGRA) 100 MG tablet Take one as needed prior  "to intercourse, MDD 1 tablet 10 Tablet 6    Multiple Vitamins-Minerals (MULTIVITAMIN ADULT PO) Take  by mouth every day.       No current facility-administered medications on file prior to encounter.         Review of Systems   Constitutional:  Negative for chills, fever, malaise/fatigue and weight loss.   HENT:  Negative for sore throat.               Objective     /60 (BP Location: Right arm, Patient Position: Sitting, BP Cuff Size: Adult)   Pulse 67   Temp 36.9 °C (98.5 °F) (Temporal)   Resp 17   Ht 1.776 m (5' 9.92\")   Wt 78.7 kg (173 lb 6.3 oz)   SpO2 96%   BMI 24.94 kg/m²      Physical Exam  Vitals reviewed.   Constitutional:       General: He is not in acute distress.     Appearance: Normal appearance. He is not diaphoretic.   HENT:      Head: Normocephalic and atraumatic.   Cardiovascular:      Rate and Rhythm: Normal rate and regular rhythm.      Heart sounds: Normal heart sounds. No murmur heard.     No friction rub. No gallop.   Pulmonary:      Effort: Pulmonary effort is normal. No respiratory distress.      Breath sounds: Normal breath sounds. No wheezing.   Musculoskeletal:         General: Normal range of motion.   Skin:     General: Skin is warm and dry.   Neurological:      Mental Status: He is alert and oriented to person, place, and time.   Psychiatric:         Mood and Affect: Mood normal.         Behavior: Behavior normal.              PATHOLOGY   FINAL DIAGNOSIS:   02/20/24  A. Right thyroid nodule fine needle aspiration:          Blood and hemosiderin laden macrophages, see comment     Comment: Cytologic examination reveals abundant blood and hemosiderin    laden macrophages suggestive of a hemorrhagic cyst.  Rare bland    epithelial cells are present in the background that could represent    thyroid follicular epithelium, however these cells are too few to    reach [place] adequacy criteria.  If these findings represent a    clinical cyst and the objective of the procedure was " to drain a cyst,    these findings could be considered clinically adequate.  If these    findings represent a discrete thyroid nodule and the objective was to    evaluate the nodule for malignancy, these findings would be considered    non-diagnostic due to hypocellularity.  Correlation with clinical    impression is suggested.  Afirma studies are not sent.        IMAGING  US-SOFT TISSUES OF HEAD - NECK   01/30/24  IMPRESSION:     A 1 cm complex nodule in the neck in the area of palpable concern, abutting the thyroid gland. It may represent an exophytic thyroid nodule or, less likely, a complicated cyst. No internal vascularity. Consider fine-needle aspiration of this nodule.         Assessment & Plan       1. Mass in neck  Referral to General Surgery              Discussed with patient that the findings are more consistent of a cyst versus a thyroid nodule.  With the pathology results showing blood and hemosiderin laden macrophages.  However I did request further evaluation and opinion from thyroid surgeon, Dr. Tika Reich.  She has agreed to see the patient as well.  I discussed this recommendation in detail with the patient today and he did verbalize understanding's and agreement with the plan.    I will now defer to surgeon for any further follow-up or management of this.      Please note that this dictation was created using voice recognition software. I have made every reasonable attempt to correct obvious errors, but I expect that there are errors of grammar and possibly content that I did not discover before finalizing the note.

## 2024-07-26 ENCOUNTER — TELEPHONE (OUTPATIENT)
Dept: HEMATOLOGY ONCOLOGY | Facility: MEDICAL CENTER | Age: 49
End: 2024-07-26
Payer: COMMERCIAL

## 2024-07-29 ENCOUNTER — TELEPHONE (OUTPATIENT)
Dept: MEDICAL GROUP | Facility: MEDICAL CENTER | Age: 49
End: 2024-07-29
Payer: COMMERCIAL

## 2024-10-09 DIAGNOSIS — N52.9 ERECTILE DYSFUNCTION, UNSPECIFIED ERECTILE DYSFUNCTION TYPE: ICD-10-CM

## 2024-10-10 RX ORDER — SILDENAFIL 100 MG/1
TABLET, FILM COATED ORAL
Qty: 10 TABLET | Refills: 0 | Status: SHIPPED | OUTPATIENT
Start: 2024-10-10

## 2025-03-25 DIAGNOSIS — N52.9 ERECTILE DYSFUNCTION, UNSPECIFIED ERECTILE DYSFUNCTION TYPE: ICD-10-CM

## 2025-03-25 RX ORDER — SILDENAFIL 100 MG/1
TABLET, FILM COATED ORAL
Qty: 10 TABLET | Refills: 3 | Status: SHIPPED | OUTPATIENT
Start: 2025-03-25

## (undated) DEVICE — MASK ANESTHESIA ADULT  - (100/CA)

## (undated) DEVICE — KIT ANESTHESIA W/CIRCUIT & 3/LT BAG W/FILTER (20EA/CA)

## (undated) DEVICE — NEPTUNE 4 PORT MANIFOLD - (20/PK)

## (undated) DEVICE — SPLINT PLASTER 3 IN X 15 IN - (50/BX 12BX/CA)

## (undated) DEVICE — HUMID-VENT HEAT AND MOISTURE EXCHANGE- (50/BX)

## (undated) DEVICE — SUTURE 4-0 ETHILON PS-2 18 (12PK/BX)"

## (undated) DEVICE — PROTECTOR ULNA NERVE - (36PR/CA)

## (undated) DEVICE — PADDING CAST 4 IN X 4 YDS - SOF-ROLL (12RL/BG 6BG/CT)

## (undated) DEVICE — SUTURE GENERAL

## (undated) DEVICE — SPLINT PLASTER 4 IN  X 15 IN - (50/BX 12BX/CA)

## (undated) DEVICE — LACTATED RINGERS INJ 1000 ML - (14EA/CA 60CA/PF)

## (undated) DEVICE — ELECTRODE DUAL RETURN W/ CORD - (50/PK)

## (undated) DEVICE — SPLINT PLASTER 5 IN X 30 IN - (50EA/BX 6BX/CA)

## (undated) DEVICE — GLOVE, LITE (PAIR)

## (undated) DEVICE — PADDING CAST 6 IN X 4 YDS - SOF-ROLL (6RL/BG 6BG/CA)

## (undated) DEVICE — SODIUM CHL IRRIGATION 0.9% 1000ML (12EA/CA)

## (undated) DEVICE — SENSOR SPO2 NEO LNCS ADHESIVE (20/BX) SEE USER NOTES

## (undated) DEVICE — ELECTRODE 850 FOAM ADHESIVE - HYDROGEL RADIOTRNSPRNT (50/PK)

## (undated) DEVICE — MASK, LARYNGEAL AIRWAY #4

## (undated) DEVICE — SPONGE DRAIN 4 X 4IN 6-PLY - (2/PK25PK/BX12BX/CS)

## (undated) DEVICE — GOWN WARMING STANDARD FLEX - (30/CA)

## (undated) DEVICE — SUTURE 2-0 VICRYL PLUS CT-1 - 8 X 18 INCH(12/BX)

## (undated) DEVICE — WATER IRRIGATION STERILE 1000ML (12EA/CA)

## (undated) DEVICE — GLOVE BIOGEL SZ 7 SURGICAL PF LTX - (50PR/BX 4BX/CA)

## (undated) DEVICE — TOURNIQUET CUFF 18 X 3 ONE PORT DISP - STERILE (10/BX)

## (undated) DEVICE — DRESSING XEROFORM 1X8 - (50/BX 4BX/CA)

## (undated) DEVICE — FIBERWIRE 2.0 (12EA/BX)

## (undated) DEVICE — STOCKINET BIAS 6 IN STERILE - (20/CA)

## (undated) DEVICE — BAG, SPONGE COUNT 50600

## (undated) DEVICE — GLOVE BIOGEL SZ 8 SURGICAL PF LTX - (50PR/BX 4BX/CA)

## (undated) DEVICE — KIT ROOM DECONTAMINATION

## (undated) DEVICE — HEAD HOLDER JUNIOR/ADULT

## (undated) DEVICE — GLOVE BIOGEL INDICATOR SZ 8 SURGICAL PF LTX - (50/BX 4BX/CA)

## (undated) DEVICE — SUTURE RETRIEVER HEWSON LIGA - 6/BX

## (undated) DEVICE — TOURNIQUET, STERILE 18 (RED)

## (undated) DEVICE — STOCKINET BIAS 4 IN STERILE - (20/CA)

## (undated) DEVICE — CANISTER SUCTION RIGID RED 1500CC (40EA/CA)

## (undated) DEVICE — TUBE CONNECTING SUCTION - CLEAR PLASTIC STERILE 72 IN (50EA/CA)

## (undated) DEVICE — PACK UPPER EXTREMITY SM OR - (3/CA)

## (undated) DEVICE — SUCTION INSTRUMENT YANKAUER BULBOUS TIP W/O VENT (50EA/CA)